# Patient Record
Sex: FEMALE | Race: WHITE
[De-identification: names, ages, dates, MRNs, and addresses within clinical notes are randomized per-mention and may not be internally consistent; named-entity substitution may affect disease eponyms.]

---

## 2018-01-28 ENCOUNTER — HOSPITAL ENCOUNTER (INPATIENT)
Dept: HOSPITAL 19 - COL.ER | Age: 68
LOS: 9 days | Discharge: NURSING, REHAB OR LONG-TERM CARE | DRG: 356 | End: 2018-02-06
Attending: INTERNAL MEDICINE | Admitting: INTERNAL MEDICINE
Payer: MEDICARE

## 2018-01-28 VITALS
DIASTOLIC BLOOD PRESSURE: 67 MMHG | SYSTOLIC BLOOD PRESSURE: 81 MMHG | TEMPERATURE: 96.8 F | OXYGEN SATURATION: 100 % | HEART RATE: 90 BPM

## 2018-01-28 VITALS — OXYGEN SATURATION: 100 %

## 2018-01-28 VITALS
OXYGEN SATURATION: 100 % | HEART RATE: 94 BPM | SYSTOLIC BLOOD PRESSURE: 122 MMHG | TEMPERATURE: 96.8 F | DIASTOLIC BLOOD PRESSURE: 57 MMHG

## 2018-01-28 VITALS — OXYGEN SATURATION: 99 %

## 2018-01-28 VITALS — OXYGEN SATURATION: 98 %

## 2018-01-28 VITALS — OXYGEN SATURATION: 88 %

## 2018-01-28 VITALS — OXYGEN SATURATION: 87 %

## 2018-01-28 VITALS — OXYGEN SATURATION: 91 %

## 2018-01-28 VITALS — OXYGEN SATURATION: 83 %

## 2018-01-28 VITALS — OXYGEN SATURATION: 96 %

## 2018-01-28 VITALS — OXYGEN SATURATION: 93 %

## 2018-01-28 VITALS — DIASTOLIC BLOOD PRESSURE: 66 MMHG | TEMPERATURE: 96.9 F | HEART RATE: 94 BPM | SYSTOLIC BLOOD PRESSURE: 145 MMHG

## 2018-01-28 VITALS — OXYGEN SATURATION: 97 %

## 2018-01-28 VITALS
HEART RATE: 90 BPM | DIASTOLIC BLOOD PRESSURE: 59 MMHG | OXYGEN SATURATION: 97 % | SYSTOLIC BLOOD PRESSURE: 92 MMHG | TEMPERATURE: 97.3 F

## 2018-01-28 VITALS
HEART RATE: 90 BPM | OXYGEN SATURATION: 100 % | DIASTOLIC BLOOD PRESSURE: 80 MMHG | SYSTOLIC BLOOD PRESSURE: 125 MMHG | TEMPERATURE: 97.1 F

## 2018-01-28 VITALS — OXYGEN SATURATION: 89 %

## 2018-01-28 VITALS — DIASTOLIC BLOOD PRESSURE: 78 MMHG | SYSTOLIC BLOOD PRESSURE: 146 MMHG | HEART RATE: 92 BPM | TEMPERATURE: 96.9 F

## 2018-01-28 VITALS
DIASTOLIC BLOOD PRESSURE: 63 MMHG | SYSTOLIC BLOOD PRESSURE: 103 MMHG | TEMPERATURE: 96.7 F | OXYGEN SATURATION: 95 % | HEART RATE: 92 BPM

## 2018-01-28 VITALS
OXYGEN SATURATION: 99 % | TEMPERATURE: 97.1 F | DIASTOLIC BLOOD PRESSURE: 66 MMHG | SYSTOLIC BLOOD PRESSURE: 145 MMHG | HEART RATE: 91 BPM

## 2018-01-28 VITALS — OXYGEN SATURATION: 94 %

## 2018-01-28 VITALS — OXYGEN SATURATION: 84 %

## 2018-01-28 VITALS
OXYGEN SATURATION: 98 % | HEART RATE: 93 BPM | TEMPERATURE: 96.9 F | SYSTOLIC BLOOD PRESSURE: 118 MMHG | DIASTOLIC BLOOD PRESSURE: 56 MMHG

## 2018-01-28 VITALS — OXYGEN SATURATION: 95 %

## 2018-01-28 VITALS
TEMPERATURE: 96.9 F | HEART RATE: 93 BPM | OXYGEN SATURATION: 100 % | SYSTOLIC BLOOD PRESSURE: 118 MMHG | DIASTOLIC BLOOD PRESSURE: 56 MMHG

## 2018-01-28 VITALS — OXYGEN SATURATION: 92 %

## 2018-01-28 VITALS — OXYGEN SATURATION: 82 %

## 2018-01-28 VITALS
OXYGEN SATURATION: 99 % | TEMPERATURE: 97 F | SYSTOLIC BLOOD PRESSURE: 137 MMHG | HEART RATE: 89 BPM | DIASTOLIC BLOOD PRESSURE: 62 MMHG

## 2018-01-28 VITALS — WEIGHT: 177.91 LBS | HEIGHT: 62.99 IN | BODY MASS INDEX: 31.52 KG/M2

## 2018-01-28 VITALS — OXYGEN SATURATION: 85 %

## 2018-01-28 VITALS — OXYGEN SATURATION: 86 %

## 2018-01-28 VITALS — OXYGEN SATURATION: 81 %

## 2018-01-28 DIAGNOSIS — N39.0: ICD-10-CM

## 2018-01-28 DIAGNOSIS — D62: ICD-10-CM

## 2018-01-28 DIAGNOSIS — Z89.411: ICD-10-CM

## 2018-01-28 DIAGNOSIS — K26.4: Primary | ICD-10-CM

## 2018-01-28 DIAGNOSIS — T87.81: ICD-10-CM

## 2018-01-28 DIAGNOSIS — I10: ICD-10-CM

## 2018-01-28 DIAGNOSIS — R57.1: ICD-10-CM

## 2018-01-28 DIAGNOSIS — E87.6: ICD-10-CM

## 2018-01-28 DIAGNOSIS — R57.8: ICD-10-CM

## 2018-01-28 DIAGNOSIS — E11.621: ICD-10-CM

## 2018-01-28 DIAGNOSIS — E87.2: ICD-10-CM

## 2018-01-28 DIAGNOSIS — H54.8: ICD-10-CM

## 2018-01-28 LAB
ALBUMIN SERPL-MCNC: 2.9 GM/DL (ref 3.5–5)
ALP SERPL-CCNC: 41 U/L (ref 50–136)
ALT SERPL-CCNC: 26 U/L (ref 9–52)
ANION GAP SERPL CALC-SCNC: 10 MMOL/L (ref 7–16)
APTT PPP: 28.4 SECONDS (ref 26–37)
AST SERPL-CCNC: 19 U/L (ref 15–37)
BASE EXCESS BLDA CALC-SCNC: -4.6 MMOL/L (ref -2–2)
BASOPHILS # BLD: 0 10*3/UL (ref 0–0.2)
BASOPHILS NFR BLD AUTO: 0.2 % (ref 0–2)
BILIRUB SERPL-MCNC: 0.4 MG/DL (ref 0–1)
BUN SERPL-MCNC: 41 MG/DL (ref 7–17)
CALCIUM SERPL-MCNC: 8.3 MG/DL (ref 8.4–10.2)
CHLORIDE SERPL-SCNC: 103 MMOL/L (ref 98–107)
CK SERPL-CCNC: 90 U/L (ref 30–135)
CO2 BLDA-SCNC: 20.7 MMOL/L
CO2 SERPL-SCNC: 21 MMOL/L (ref 22–30)
CREAT SERPL-SCNC: 1.42 MG/DL (ref 0.52–1.25)
DRUGS UR SCN NOM: NEGATIVE NG/ML
EOSINOPHIL # BLD: 0 10*3/UL (ref 0–0.7)
EOSINOPHIL NFR BLD: 0.1 % (ref 0–4)
ERYTHROCYTE [DISTWIDTH] IN BLOOD BY AUTOMATED COUNT: 17.8 % (ref 11.5–14.5)
GLUCOSE SERPL-MCNC: 179 MG/DL (ref 74–106)
GRANULOCYTES # BLD AUTO: 83.9 % (ref 42.2–75.2)
HCO3 BLDA-SCNC: 19.8 MEQ/L (ref 22–26)
HCT VFR BLD AUTO: 11.6 % (ref 37–47)
HGB BLD-MCNC: 3.5 G/DL (ref 12.5–16)
HYALINE CASTS #/AREA URNS LPF: (no result) /LPF
INR BLD: 1.2 (ref 0.8–3)
LYMPHOCYTES # BLD: 1.3 10*3/UL (ref 1.2–3.4)
LYMPHOCYTES NFR BLD: 10.6 % (ref 20–51)
MCH RBC QN AUTO: 32 PG (ref 27–31)
MCHC RBC AUTO-ENTMCNC: 30 G/DL (ref 33–37)
MCV RBC AUTO: 106 FL (ref 80–100)
MONOCYTES # BLD: 0.5 10*3/UL (ref 0.1–0.6)
MONOCYTES NFR BLD AUTO: 4 % (ref 1.7–9.3)
NEUTROPHILS # BLD: 10.3 10*3/UL (ref 1.4–6.5)
PCO2 BLDA: 31.6 MMHG (ref 35–45)
PH UR STRIP.AUTO: 6 [PH] (ref 5–8)
PLATELET # BLD AUTO: 375 K/MM3 (ref 130–400)
PMV BLD AUTO: 10.3 FL (ref 7.4–10.4)
PO2 BLDA: 156.1 MMHG (ref 80–100)
POTASSIUM SERPL-SCNC: 4.4 MMOL/L (ref 3.4–5)
PROT SERPL-MCNC: 5.6 GM/DL (ref 6.4–8.2)
PROTHROMBIN TIME: 14.4 SECONDS (ref 9.7–12.8)
RBC # BLD AUTO: 1.1 M/MM3 (ref 4.1–5.3)
RBC # UR: (no result) /HPF
SAO2 % BLDA: 98.5 % (ref 92–100)
SODIUM SERPL-SCNC: 134 MMOL/L (ref 137–145)
SP GR UR STRIP.AUTO: 1.01 (ref 1–1.03)
TROPONIN I SERPL-MCNC: < 0.012 NG/ML (ref 0–0.03)
URINE LEUKOCYTE ESTERASE: (no result)
URN COLLECT METHOD CLASS: (no result)
WBC # UR: (no result) /HPF

## 2018-01-28 PROCEDURE — C9113 INJ PANTOPRAZOLE SODIUM, VIA: HCPCS

## 2018-01-28 PROCEDURE — P9016 RBC LEUKOCYTES REDUCED: HCPCS

## 2018-01-29 VITALS — OXYGEN SATURATION: 98 %

## 2018-01-29 VITALS — OXYGEN SATURATION: 99 %

## 2018-01-29 VITALS — OXYGEN SATURATION: 97 %

## 2018-01-29 VITALS
DIASTOLIC BLOOD PRESSURE: 86 MMHG | TEMPERATURE: 98.5 F | HEART RATE: 108 BPM | OXYGEN SATURATION: 96 % | SYSTOLIC BLOOD PRESSURE: 163 MMHG

## 2018-01-29 VITALS — OXYGEN SATURATION: 96 %

## 2018-01-29 VITALS — OXYGEN SATURATION: 94 %

## 2018-01-29 VITALS — OXYGEN SATURATION: 100 %

## 2018-01-29 VITALS — OXYGEN SATURATION: 95 %

## 2018-01-29 VITALS
SYSTOLIC BLOOD PRESSURE: 159 MMHG | HEART RATE: 102 BPM | TEMPERATURE: 99.2 F | OXYGEN SATURATION: 97 % | DIASTOLIC BLOOD PRESSURE: 67 MMHG

## 2018-01-29 VITALS — TEMPERATURE: 97.8 F | HEART RATE: 97 BPM | SYSTOLIC BLOOD PRESSURE: 117 MMHG | DIASTOLIC BLOOD PRESSURE: 91 MMHG

## 2018-01-29 VITALS
DIASTOLIC BLOOD PRESSURE: 71 MMHG | TEMPERATURE: 98.8 F | OXYGEN SATURATION: 97 % | HEART RATE: 97 BPM | SYSTOLIC BLOOD PRESSURE: 159 MMHG

## 2018-01-29 VITALS — OXYGEN SATURATION: 93 %

## 2018-01-29 VITALS
DIASTOLIC BLOOD PRESSURE: 76 MMHG | OXYGEN SATURATION: 98 % | HEART RATE: 98 BPM | SYSTOLIC BLOOD PRESSURE: 162 MMHG | TEMPERATURE: 97.2 F

## 2018-01-29 VITALS
OXYGEN SATURATION: 100 % | SYSTOLIC BLOOD PRESSURE: 172 MMHG | DIASTOLIC BLOOD PRESSURE: 71 MMHG | HEART RATE: 110 BPM | TEMPERATURE: 98.7 F

## 2018-01-29 VITALS
SYSTOLIC BLOOD PRESSURE: 180 MMHG | TEMPERATURE: 98.5 F | OXYGEN SATURATION: 97 % | HEART RATE: 107 BPM | DIASTOLIC BLOOD PRESSURE: 77 MMHG

## 2018-01-29 VITALS
DIASTOLIC BLOOD PRESSURE: 70 MMHG | HEART RATE: 102 BPM | TEMPERATURE: 97.6 F | OXYGEN SATURATION: 97 % | SYSTOLIC BLOOD PRESSURE: 162 MMHG

## 2018-01-29 VITALS
DIASTOLIC BLOOD PRESSURE: 77 MMHG | TEMPERATURE: 98.3 F | HEART RATE: 105 BPM | OXYGEN SATURATION: 96 % | SYSTOLIC BLOOD PRESSURE: 180 MMHG

## 2018-01-29 VITALS — OXYGEN SATURATION: 91 %

## 2018-01-29 VITALS
HEART RATE: 107 BPM | OXYGEN SATURATION: 100 % | SYSTOLIC BLOOD PRESSURE: 172 MMHG | DIASTOLIC BLOOD PRESSURE: 71 MMHG | TEMPERATURE: 98.4 F

## 2018-01-29 VITALS
SYSTOLIC BLOOD PRESSURE: 168 MMHG | DIASTOLIC BLOOD PRESSURE: 70 MMHG | HEART RATE: 107 BPM | TEMPERATURE: 98.1 F | OXYGEN SATURATION: 97 %

## 2018-01-29 VITALS
SYSTOLIC BLOOD PRESSURE: 123 MMHG | HEART RATE: 97 BPM | DIASTOLIC BLOOD PRESSURE: 56 MMHG | TEMPERATURE: 98.4 F | OXYGEN SATURATION: 94 %

## 2018-01-29 VITALS — OXYGEN SATURATION: 89 %

## 2018-01-29 VITALS — OXYGEN SATURATION: 92 %

## 2018-01-29 VITALS
TEMPERATURE: 98.2 F | OXYGEN SATURATION: 100 % | SYSTOLIC BLOOD PRESSURE: 169 MMHG | HEART RATE: 107 BPM | DIASTOLIC BLOOD PRESSURE: 77 MMHG

## 2018-01-29 VITALS — OXYGEN SATURATION: 90 %

## 2018-01-29 VITALS
OXYGEN SATURATION: 100 % | HEART RATE: 103 BPM | TEMPERATURE: 98.3 F | SYSTOLIC BLOOD PRESSURE: 170 MMHG | DIASTOLIC BLOOD PRESSURE: 76 MMHG

## 2018-01-29 VITALS
SYSTOLIC BLOOD PRESSURE: 161 MMHG | HEART RATE: 102 BPM | DIASTOLIC BLOOD PRESSURE: 77 MMHG | TEMPERATURE: 98.1 F | OXYGEN SATURATION: 96 %

## 2018-01-29 VITALS — OXYGEN SATURATION: 88 %

## 2018-01-29 LAB
ANION GAP SERPL CALC-SCNC: 8 MMOL/L (ref 7–16)
BASOPHILS # BLD: 0.1 10*3/UL (ref 0–0.2)
BASOPHILS NFR BLD AUTO: 0.5 % (ref 0–2)
BUN SERPL-MCNC: 26 MG/DL (ref 7–17)
CALCIUM SERPL-MCNC: 8.7 MG/DL (ref 8.4–10.2)
CHLORIDE SERPL-SCNC: 106 MMOL/L (ref 98–107)
CO2 SERPL-SCNC: 22 MMOL/L (ref 22–30)
CREAT SERPL-SCNC: 0.89 MG/DL (ref 0.52–1.25)
EOSINOPHIL # BLD: 0 10*3/UL (ref 0–0.7)
EOSINOPHIL NFR BLD: 0.1 % (ref 0–4)
ERYTHROCYTE [DISTWIDTH] IN BLOOD BY AUTOMATED COUNT: 16.1 % (ref 11.5–14.5)
GLUCOSE SERPL-MCNC: 132 MG/DL (ref 74–106)
GRANULOCYTES # BLD AUTO: 78.6 % (ref 42.2–75.2)
HCT VFR BLD AUTO: 20.9 % (ref 37–47)
HCT VFR BLD AUTO: 27.3 % (ref 37–47)
HCT VFR BLD AUTO: 28.2 % (ref 37–47)
HGB BLD-MCNC: 6.9 G/DL (ref 12.5–16)
HGB BLD-MCNC: 9.2 G/DL (ref 12.5–16)
HGB BLD-MCNC: 9.4 G/DL (ref 12.5–16)
LYMPHOCYTES # BLD: 1.6 10*3/UL (ref 1.2–3.4)
LYMPHOCYTES NFR BLD: 13.4 % (ref 20–51)
MCH RBC QN AUTO: 31 PG (ref 27–31)
MCHC RBC AUTO-ENTMCNC: 33 G/DL (ref 33–37)
MCV RBC AUTO: 93 FL (ref 80–100)
MONOCYTES # BLD: 0.8 10*3/UL (ref 0.1–0.6)
MONOCYTES NFR BLD AUTO: 6.4 % (ref 1.7–9.3)
NEUTROPHILS # BLD: 9.2 10*3/UL (ref 1.4–6.5)
PLATELET # BLD AUTO: 332 K/MM3 (ref 130–400)
PMV BLD AUTO: 10.1 FL (ref 7.4–10.4)
POTASSIUM SERPL-SCNC: 4.3 MMOL/L (ref 3.4–5)
RBC # BLD AUTO: 3.02 M/MM3 (ref 4.1–5.3)
SODIUM SERPL-SCNC: 136 MMOL/L (ref 137–145)

## 2018-01-29 PROCEDURE — 0W3P8ZZ CONTROL BLEEDING IN GASTROINTESTINAL TRACT, VIA NATURAL OR ARTIFICIAL OPENING ENDOSCOPIC: ICD-10-PCS | Performed by: SPECIALIST

## 2018-01-29 PROCEDURE — 0DB68ZX EXCISION OF STOMACH, VIA NATURAL OR ARTIFICIAL OPENING ENDOSCOPIC, DIAGNOSTIC: ICD-10-PCS | Performed by: SPECIALIST

## 2018-01-30 VITALS — OXYGEN SATURATION: 98 %

## 2018-01-30 VITALS — OXYGEN SATURATION: 88 %

## 2018-01-30 VITALS — OXYGEN SATURATION: 97 %

## 2018-01-30 VITALS — OXYGEN SATURATION: 92 %

## 2018-01-30 VITALS — OXYGEN SATURATION: 96 %

## 2018-01-30 VITALS
OXYGEN SATURATION: 100 % | TEMPERATURE: 98.4 F | DIASTOLIC BLOOD PRESSURE: 68 MMHG | HEART RATE: 86 BPM | SYSTOLIC BLOOD PRESSURE: 136 MMHG

## 2018-01-30 VITALS — DIASTOLIC BLOOD PRESSURE: 67 MMHG | TEMPERATURE: 98.4 F | HEART RATE: 99 BPM | SYSTOLIC BLOOD PRESSURE: 144 MMHG

## 2018-01-30 VITALS — OXYGEN SATURATION: 95 %

## 2018-01-30 VITALS — OXYGEN SATURATION: 93 %

## 2018-01-30 VITALS — OXYGEN SATURATION: 99 %

## 2018-01-30 VITALS — OXYGEN SATURATION: 94 %

## 2018-01-30 VITALS — OXYGEN SATURATION: 91 %

## 2018-01-30 VITALS
DIASTOLIC BLOOD PRESSURE: 64 MMHG | TEMPERATURE: 98.2 F | HEART RATE: 92 BPM | OXYGEN SATURATION: 99 % | SYSTOLIC BLOOD PRESSURE: 122 MMHG

## 2018-01-30 VITALS — DIASTOLIC BLOOD PRESSURE: 72 MMHG | HEART RATE: 91 BPM | SYSTOLIC BLOOD PRESSURE: 131 MMHG | TEMPERATURE: 98.9 F

## 2018-01-30 VITALS — OXYGEN SATURATION: 89 %

## 2018-01-30 VITALS — OXYGEN SATURATION: 100 %

## 2018-01-30 VITALS — OXYGEN SATURATION: 90 %

## 2018-01-30 VITALS — OXYGEN SATURATION: 87 %

## 2018-01-30 VITALS — SYSTOLIC BLOOD PRESSURE: 151 MMHG | HEART RATE: 89 BPM | DIASTOLIC BLOOD PRESSURE: 67 MMHG | OXYGEN SATURATION: 95 %

## 2018-01-30 VITALS — DIASTOLIC BLOOD PRESSURE: 69 MMHG | TEMPERATURE: 98.7 F | HEART RATE: 92 BPM | SYSTOLIC BLOOD PRESSURE: 144 MMHG

## 2018-01-30 LAB
ANION GAP SERPL CALC-SCNC: 6 MMOL/L (ref 7–16)
BASOPHILS # BLD: 0.1 10*3/UL (ref 0–0.2)
BASOPHILS NFR BLD AUTO: 0.6 % (ref 0–2)
BUN SERPL-MCNC: 16 MG/DL (ref 7–17)
CALCIUM SERPL-MCNC: 8.3 MG/DL (ref 8.4–10.2)
CHLORIDE SERPL-SCNC: 107 MMOL/L (ref 98–107)
CO2 SERPL-SCNC: 22 MMOL/L (ref 22–30)
CREAT SERPL-SCNC: 0.75 MG/DL (ref 0.52–1.25)
EOSINOPHIL # BLD: 0.1 10*3/UL (ref 0–0.7)
EOSINOPHIL NFR BLD: 1.4 % (ref 0–4)
ERYTHROCYTE [DISTWIDTH] IN BLOOD BY AUTOMATED COUNT: 17.2 % (ref 11.5–14.5)
GLUCOSE SERPL-MCNC: 88 MG/DL (ref 74–106)
GRANULOCYTES # BLD AUTO: 61.6 % (ref 42.2–75.2)
HCT VFR BLD AUTO: 26.1 % (ref 37–47)
HCT VFR BLD AUTO: 26.3 % (ref 37–47)
HGB BLD-MCNC: 8.7 G/DL (ref 12.5–16)
HGB BLD-MCNC: 8.7 G/DL (ref 12.5–16)
LYMPHOCYTES # BLD: 2.2 10*3/UL (ref 1.2–3.4)
LYMPHOCYTES NFR BLD: 27.7 % (ref 20–51)
MCH RBC QN AUTO: 32 PG (ref 27–31)
MCHC RBC AUTO-ENTMCNC: 33 G/DL (ref 33–37)
MCV RBC AUTO: 96 FL (ref 80–100)
MONOCYTES # BLD: 0.6 10*3/UL (ref 0.1–0.6)
MONOCYTES NFR BLD AUTO: 8.1 % (ref 1.7–9.3)
NEUTROPHILS # BLD: 4.8 10*3/UL (ref 1.4–6.5)
PLATELET # BLD AUTO: 309 K/MM3 (ref 130–400)
PMV BLD AUTO: 9.7 FL (ref 7.4–10.4)
POTASSIUM SERPL-SCNC: 3.4 MMOL/L (ref 3.4–5)
RBC # BLD AUTO: 2.75 M/MM3 (ref 4.1–5.3)
SODIUM SERPL-SCNC: 135 MMOL/L (ref 137–145)

## 2018-01-31 VITALS — HEART RATE: 82 BPM | TEMPERATURE: 98.1 F | SYSTOLIC BLOOD PRESSURE: 143 MMHG | DIASTOLIC BLOOD PRESSURE: 86 MMHG

## 2018-01-31 VITALS — HEART RATE: 93 BPM | SYSTOLIC BLOOD PRESSURE: 138 MMHG | DIASTOLIC BLOOD PRESSURE: 77 MMHG | TEMPERATURE: 97.9 F

## 2018-01-31 VITALS — TEMPERATURE: 98.3 F | HEART RATE: 86 BPM | DIASTOLIC BLOOD PRESSURE: 60 MMHG | SYSTOLIC BLOOD PRESSURE: 148 MMHG

## 2018-01-31 VITALS — SYSTOLIC BLOOD PRESSURE: 131 MMHG | TEMPERATURE: 98.1 F | DIASTOLIC BLOOD PRESSURE: 59 MMHG | HEART RATE: 88 BPM

## 2018-01-31 LAB
ANION GAP SERPL CALC-SCNC: 7 MMOL/L (ref 7–16)
ANISOCYTOSIS BLD QL: (no result)
BUN SERPL-MCNC: 10 MG/DL (ref 7–17)
CALCIUM SERPL-MCNC: 8.2 MG/DL (ref 8.4–10.2)
CHLORIDE SERPL-SCNC: 105 MMOL/L (ref 98–107)
CO2 SERPL-SCNC: 24 MMOL/L (ref 22–30)
CREAT SERPL-SCNC: 0.71 MG/DL (ref 0.52–1.25)
EOSINOPHIL NFR BLD: 3 % (ref 0–4)
ERYTHROCYTE [DISTWIDTH] IN BLOOD BY AUTOMATED COUNT: 17.2 % (ref 11.5–14.5)
GLUCOSE SERPL-MCNC: 85 MG/DL (ref 74–106)
HCT VFR BLD AUTO: 25.5 % (ref 37–47)
HGB BLD-MCNC: 8.4 G/DL (ref 12.5–16)
HYPOCHROMIA BLD QL SMEAR: (no result)
LYMPHOCYTES NFR BLD MANUAL: 26 % (ref 20–51)
MAGNESIUM SERPL-MCNC: 1.8 MG/DL (ref 1.6–2.3)
MCH RBC QN AUTO: 32 PG (ref 27–31)
MCHC RBC AUTO-ENTMCNC: 33 G/DL (ref 33–37)
MCV RBC AUTO: 96 FL (ref 80–100)
MONOCYTES NFR BLD: 2 % (ref 1.7–9.3)
NEUTS BAND NFR BLD: 21 % (ref 0–10)
NEUTS SEG NFR BLD MANUAL: 48 % (ref 42–75.2)
PLATELET # BLD AUTO: 314 K/MM3 (ref 130–400)
PLATELET BLD QL SMEAR: NORMAL
PMV BLD AUTO: 10.1 FL (ref 7.4–10.4)
POTASSIUM SERPL-SCNC: 2.9 MMOL/L (ref 3.4–5)
RBC # BLD AUTO: 2.65 M/MM3 (ref 4.1–5.3)
SODIUM SERPL-SCNC: 136 MMOL/L (ref 137–145)

## 2018-01-31 PROCEDURE — 0QBN0ZZ EXCISION OF RIGHT METATARSAL, OPEN APPROACH: ICD-10-PCS | Performed by: ORTHOPAEDIC SURGERY

## 2018-02-01 VITALS — DIASTOLIC BLOOD PRESSURE: 77 MMHG | HEART RATE: 95 BPM | TEMPERATURE: 98.5 F | SYSTOLIC BLOOD PRESSURE: 153 MMHG

## 2018-02-01 VITALS — HEART RATE: 81 BPM | TEMPERATURE: 97.9 F | SYSTOLIC BLOOD PRESSURE: 153 MMHG | DIASTOLIC BLOOD PRESSURE: 74 MMHG

## 2018-02-01 VITALS — SYSTOLIC BLOOD PRESSURE: 154 MMHG | HEART RATE: 80 BPM | TEMPERATURE: 98 F | DIASTOLIC BLOOD PRESSURE: 73 MMHG

## 2018-02-01 VITALS — SYSTOLIC BLOOD PRESSURE: 159 MMHG | TEMPERATURE: 98.1 F | DIASTOLIC BLOOD PRESSURE: 72 MMHG | HEART RATE: 85 BPM

## 2018-02-01 VITALS — SYSTOLIC BLOOD PRESSURE: 136 MMHG | TEMPERATURE: 97.7 F | HEART RATE: 93 BPM | DIASTOLIC BLOOD PRESSURE: 55 MMHG

## 2018-02-01 VITALS — DIASTOLIC BLOOD PRESSURE: 76 MMHG | SYSTOLIC BLOOD PRESSURE: 146 MMHG | HEART RATE: 86 BPM | TEMPERATURE: 97.9 F

## 2018-02-01 VITALS — TEMPERATURE: 97.8 F | SYSTOLIC BLOOD PRESSURE: 144 MMHG | HEART RATE: 171 BPM | DIASTOLIC BLOOD PRESSURE: 81 MMHG

## 2018-02-01 LAB
ANION GAP SERPL CALC-SCNC: 5 MMOL/L (ref 7–16)
BASOPHILS # BLD: 0 10*3/UL (ref 0–0.2)
BASOPHILS NFR BLD AUTO: 0.6 % (ref 0–2)
BUN SERPL-MCNC: 7 MG/DL (ref 7–17)
CALCIUM SERPL-MCNC: 8.2 MG/DL (ref 8.4–10.2)
CHLORIDE SERPL-SCNC: 103 MMOL/L (ref 98–107)
CO2 SERPL-SCNC: 27 MMOL/L (ref 22–30)
CREAT SERPL-SCNC: 0.7 MG/DL (ref 0.52–1.25)
EOSINOPHIL # BLD: 0.3 10*3/UL (ref 0–0.7)
EOSINOPHIL NFR BLD: 6.4 % (ref 0–4)
ERYTHROCYTE [DISTWIDTH] IN BLOOD BY AUTOMATED COUNT: 17.1 % (ref 11.5–14.5)
GLUCOSE SERPL-MCNC: 102 MG/DL (ref 74–106)
GRANULOCYTES # BLD AUTO: 49.3 % (ref 42.2–75.2)
HCT VFR BLD AUTO: 26.3 % (ref 37–47)
HGB BLD-MCNC: 8.4 G/DL (ref 12.5–16)
LYMPHOCYTES # BLD: 1.6 10*3/UL (ref 1.2–3.4)
LYMPHOCYTES NFR BLD: 34.6 % (ref 20–51)
MCH RBC QN AUTO: 31 PG (ref 27–31)
MCHC RBC AUTO-ENTMCNC: 32 G/DL (ref 33–37)
MCV RBC AUTO: 98 FL (ref 80–100)
MONOCYTES # BLD: 0.4 10*3/UL (ref 0.1–0.6)
MONOCYTES NFR BLD AUTO: 8.5 % (ref 1.7–9.3)
NEUTROPHILS # BLD: 2.3 10*3/UL (ref 1.4–6.5)
PLATELET # BLD AUTO: 304 K/MM3 (ref 130–400)
PMV BLD AUTO: 9.9 FL (ref 7.4–10.4)
POTASSIUM SERPL-SCNC: 3.6 MMOL/L (ref 3.4–5)
RBC # BLD AUTO: 2.68 M/MM3 (ref 4.1–5.3)
SODIUM SERPL-SCNC: 136 MMOL/L (ref 137–145)

## 2018-02-02 VITALS — DIASTOLIC BLOOD PRESSURE: 97 MMHG | HEART RATE: 84 BPM | TEMPERATURE: 97.9 F | SYSTOLIC BLOOD PRESSURE: 134 MMHG

## 2018-02-02 VITALS — HEART RATE: 93 BPM | SYSTOLIC BLOOD PRESSURE: 182 MMHG | DIASTOLIC BLOOD PRESSURE: 82 MMHG | TEMPERATURE: 98 F

## 2018-02-02 VITALS — SYSTOLIC BLOOD PRESSURE: 168 MMHG | DIASTOLIC BLOOD PRESSURE: 81 MMHG | TEMPERATURE: 97.9 F | HEART RATE: 75 BPM

## 2018-02-02 VITALS — SYSTOLIC BLOOD PRESSURE: 113 MMHG | HEART RATE: 86 BPM | TEMPERATURE: 98 F | DIASTOLIC BLOOD PRESSURE: 94 MMHG

## 2018-02-02 VITALS — HEART RATE: 105 BPM | TEMPERATURE: 98 F | SYSTOLIC BLOOD PRESSURE: 145 MMHG | DIASTOLIC BLOOD PRESSURE: 82 MMHG

## 2018-02-02 LAB
ANION GAP SERPL CALC-SCNC: 5 MMOL/L (ref 7–16)
BASOPHILS # BLD: 0 10*3/UL (ref 0–0.2)
BASOPHILS NFR BLD AUTO: 0.5 % (ref 0–2)
BUN SERPL-MCNC: 8 MG/DL (ref 7–17)
CALCIUM SERPL-MCNC: 8.6 MG/DL (ref 8.4–10.2)
CHLORIDE SERPL-SCNC: 102 MMOL/L (ref 98–107)
CO2 SERPL-SCNC: 29 MMOL/L (ref 22–30)
CREAT SERPL-SCNC: 0.74 MG/DL (ref 0.52–1.25)
EOSINOPHIL # BLD: 0.3 10*3/UL (ref 0–0.7)
EOSINOPHIL NFR BLD: 4.9 % (ref 0–4)
ERYTHROCYTE [DISTWIDTH] IN BLOOD BY AUTOMATED COUNT: 17.2 % (ref 11.5–14.5)
GLUCOSE SERPL-MCNC: 135 MG/DL (ref 74–106)
GRANULOCYTES # BLD AUTO: 66.8 % (ref 42.2–75.2)
HCT VFR BLD AUTO: 26.6 % (ref 37–47)
HGB BLD-MCNC: 8.5 G/DL (ref 12.5–16)
LYMPHOCYTES # BLD: 1.2 10*3/UL (ref 1.2–3.4)
LYMPHOCYTES NFR BLD: 21.3 % (ref 20–51)
MCH RBC QN AUTO: 32 PG (ref 27–31)
MCHC RBC AUTO-ENTMCNC: 32 G/DL (ref 33–37)
MCV RBC AUTO: 99 FL (ref 80–100)
MONOCYTES # BLD: 0.3 10*3/UL (ref 0.1–0.6)
MONOCYTES NFR BLD AUTO: 6.1 % (ref 1.7–9.3)
NEUTROPHILS # BLD: 3.7 10*3/UL (ref 1.4–6.5)
PLATELET # BLD AUTO: 300 K/MM3 (ref 130–400)
PMV BLD AUTO: 10.1 FL (ref 7.4–10.4)
POTASSIUM SERPL-SCNC: 3.3 MMOL/L (ref 3.4–5)
RBC # BLD AUTO: 2.69 M/MM3 (ref 4.1–5.3)
SODIUM SERPL-SCNC: 136 MMOL/L (ref 137–145)

## 2018-02-03 VITALS — SYSTOLIC BLOOD PRESSURE: 152 MMHG | HEART RATE: 91 BPM | TEMPERATURE: 98.1 F | DIASTOLIC BLOOD PRESSURE: 89 MMHG

## 2018-02-03 VITALS — SYSTOLIC BLOOD PRESSURE: 154 MMHG | HEART RATE: 91 BPM | DIASTOLIC BLOOD PRESSURE: 75 MMHG | TEMPERATURE: 98.3 F

## 2018-02-03 VITALS — TEMPERATURE: 99.2 F | DIASTOLIC BLOOD PRESSURE: 68 MMHG | SYSTOLIC BLOOD PRESSURE: 159 MMHG | HEART RATE: 96 BPM

## 2018-02-03 VITALS — SYSTOLIC BLOOD PRESSURE: 174 MMHG | HEART RATE: 91 BPM | DIASTOLIC BLOOD PRESSURE: 75 MMHG | TEMPERATURE: 98.1 F

## 2018-02-03 VITALS — HEART RATE: 99 BPM | DIASTOLIC BLOOD PRESSURE: 75 MMHG | TEMPERATURE: 98.7 F | SYSTOLIC BLOOD PRESSURE: 150 MMHG

## 2018-02-03 VITALS — DIASTOLIC BLOOD PRESSURE: 76 MMHG | SYSTOLIC BLOOD PRESSURE: 151 MMHG | TEMPERATURE: 98.3 F | HEART RATE: 95 BPM

## 2018-02-03 VITALS — SYSTOLIC BLOOD PRESSURE: 134 MMHG | HEART RATE: 97 BPM | DIASTOLIC BLOOD PRESSURE: 65 MMHG | TEMPERATURE: 98 F

## 2018-02-03 LAB
ANION GAP SERPL CALC-SCNC: 8 MMOL/L (ref 7–16)
BASOPHILS # BLD: 0 10*3/UL (ref 0–0.2)
BASOPHILS NFR BLD AUTO: 0.6 % (ref 0–2)
BUN SERPL-MCNC: 11 MG/DL (ref 7–17)
CALCIUM SERPL-MCNC: 9.3 MG/DL (ref 8.4–10.2)
CHLORIDE SERPL-SCNC: 101 MMOL/L (ref 98–107)
CO2 SERPL-SCNC: 28 MMOL/L (ref 22–30)
CREAT SERPL-SCNC: 0.77 MG/DL (ref 0.52–1.25)
EOSINOPHIL # BLD: 0.3 10*3/UL (ref 0–0.7)
EOSINOPHIL NFR BLD: 5.3 % (ref 0–4)
ERYTHROCYTE [DISTWIDTH] IN BLOOD BY AUTOMATED COUNT: 17.5 % (ref 11.5–14.5)
GLUCOSE SERPL-MCNC: 90 MG/DL (ref 74–106)
GRANULOCYTES # BLD AUTO: 43.7 % (ref 42.2–75.2)
HCT VFR BLD AUTO: 28.8 % (ref 37–47)
HGB BLD-MCNC: 9.1 G/DL (ref 12.5–16)
LYMPHOCYTES # BLD: 2.2 10*3/UL (ref 1.2–3.4)
LYMPHOCYTES NFR BLD: 43.1 % (ref 20–51)
MAGNESIUM SERPL-MCNC: 1.9 MG/DL (ref 1.6–2.3)
MCH RBC QN AUTO: 31 PG (ref 27–31)
MCHC RBC AUTO-ENTMCNC: 32 G/DL (ref 33–37)
MCV RBC AUTO: 99 FL (ref 80–100)
MONOCYTES # BLD: 0.4 10*3/UL (ref 0.1–0.6)
MONOCYTES NFR BLD AUTO: 7.1 % (ref 1.7–9.3)
NEUTROPHILS # BLD: 2.2 10*3/UL (ref 1.4–6.5)
PLATELET # BLD AUTO: 335 K/MM3 (ref 130–400)
PMV BLD AUTO: 10.2 FL (ref 7.4–10.4)
POTASSIUM SERPL-SCNC: 3.6 MMOL/L (ref 3.4–5)
RBC # BLD AUTO: 2.9 M/MM3 (ref 4.1–5.3)
SODIUM SERPL-SCNC: 137 MMOL/L (ref 137–145)

## 2018-02-04 VITALS — SYSTOLIC BLOOD PRESSURE: 142 MMHG | HEART RATE: 86 BPM | TEMPERATURE: 98.1 F | DIASTOLIC BLOOD PRESSURE: 71 MMHG

## 2018-02-04 VITALS — SYSTOLIC BLOOD PRESSURE: 142 MMHG | HEART RATE: 71 BPM | TEMPERATURE: 97.9 F | DIASTOLIC BLOOD PRESSURE: 38 MMHG

## 2018-02-04 VITALS — DIASTOLIC BLOOD PRESSURE: 91 MMHG | HEART RATE: 97 BPM | TEMPERATURE: 98.3 F | SYSTOLIC BLOOD PRESSURE: 141 MMHG

## 2018-02-04 VITALS — TEMPERATURE: 97.7 F | SYSTOLIC BLOOD PRESSURE: 153 MMHG | DIASTOLIC BLOOD PRESSURE: 72 MMHG | HEART RATE: 76 BPM

## 2018-02-04 VITALS — HEART RATE: 74 BPM | DIASTOLIC BLOOD PRESSURE: 83 MMHG | SYSTOLIC BLOOD PRESSURE: 141 MMHG | TEMPERATURE: 98.1 F

## 2018-02-04 VITALS — TEMPERATURE: 98 F | DIASTOLIC BLOOD PRESSURE: 53 MMHG | SYSTOLIC BLOOD PRESSURE: 115 MMHG | HEART RATE: 89 BPM

## 2018-02-04 LAB
ANION GAP SERPL CALC-SCNC: 7 MMOL/L (ref 7–16)
ANISOCYTOSIS BLD QL: (no result)
BASOPHILS NFR BLD MANUAL: 1 % (ref 0–2)
BUN SERPL-MCNC: 13 MG/DL (ref 7–17)
CALCIUM SERPL-MCNC: 9.1 MG/DL (ref 8.4–10.2)
CHLORIDE SERPL-SCNC: 103 MMOL/L (ref 98–107)
CO2 SERPL-SCNC: 27 MMOL/L (ref 22–30)
CREAT SERPL-SCNC: 0.91 MG/DL (ref 0.52–1.25)
EOSINOPHIL NFR BLD: 6 % (ref 0–4)
ERYTHROCYTE [DISTWIDTH] IN BLOOD BY AUTOMATED COUNT: 17.3 % (ref 11.5–14.5)
FOLATE (FOLIC ACID): 11.7 NG/ML (ref 7–31.4)
GLUCOSE SERPL-MCNC: 96 MG/DL (ref 74–106)
HCT VFR BLD AUTO: 30.4 % (ref 37–47)
HGB BLD-MCNC: 9.7 G/DL (ref 12.5–16)
LYMPHOCYTES NFR BLD MANUAL: 34 % (ref 20–51)
MAGNESIUM SERPL-MCNC: 2.1 MG/DL (ref 1.6–2.3)
MCH RBC QN AUTO: 32 PG (ref 27–31)
MCHC RBC AUTO-ENTMCNC: 32 G/DL (ref 33–37)
MCV RBC AUTO: 100 FL (ref 80–100)
MONOCYTES NFR BLD: 4 % (ref 1.7–9.3)
NEUTS BAND NFR BLD: 12 % (ref 0–10)
NEUTS SEG NFR BLD MANUAL: 43 % (ref 42–75.2)
PHOSPHATE SERPL-MCNC: 4.5 MG/DL (ref 2.5–4.5)
PLATELET # BLD AUTO: 348 K/MM3 (ref 130–400)
PLATELET BLD QL SMEAR: NORMAL
PMV BLD AUTO: 9.9 FL (ref 7.4–10.4)
POLYCHROMASIA BLD QL SMEAR: (no result)
POTASSIUM SERPL-SCNC: 4.1 MMOL/L (ref 3.4–5)
RBC # BLD AUTO: 3.05 M/MM3 (ref 4.1–5.3)
SODIUM SERPL-SCNC: 137 MMOL/L (ref 137–145)
TOXIC GRANULES BLD QL SMEAR: PRESENT

## 2018-02-05 VITALS — HEART RATE: 62 BPM | TEMPERATURE: 97.9 F | SYSTOLIC BLOOD PRESSURE: 113 MMHG | DIASTOLIC BLOOD PRESSURE: 57 MMHG

## 2018-02-05 VITALS — HEART RATE: 77 BPM | DIASTOLIC BLOOD PRESSURE: 53 MMHG | SYSTOLIC BLOOD PRESSURE: 131 MMHG | TEMPERATURE: 98 F

## 2018-02-05 VITALS — SYSTOLIC BLOOD PRESSURE: 111 MMHG | HEART RATE: 76 BPM | DIASTOLIC BLOOD PRESSURE: 58 MMHG | TEMPERATURE: 97.7 F

## 2018-02-05 VITALS — DIASTOLIC BLOOD PRESSURE: 52 MMHG | SYSTOLIC BLOOD PRESSURE: 117 MMHG | HEART RATE: 85 BPM

## 2018-02-05 VITALS — HEART RATE: 73 BPM | TEMPERATURE: 97.3 F | SYSTOLIC BLOOD PRESSURE: 103 MMHG | DIASTOLIC BLOOD PRESSURE: 48 MMHG

## 2018-02-05 VITALS — SYSTOLIC BLOOD PRESSURE: 119 MMHG | DIASTOLIC BLOOD PRESSURE: 76 MMHG | HEART RATE: 69 BPM | TEMPERATURE: 98.1 F

## 2018-02-05 VITALS — SYSTOLIC BLOOD PRESSURE: 105 MMHG | DIASTOLIC BLOOD PRESSURE: 48 MMHG | TEMPERATURE: 97.9 F | HEART RATE: 71 BPM

## 2018-02-05 VITALS — DIASTOLIC BLOOD PRESSURE: 81 MMHG | SYSTOLIC BLOOD PRESSURE: 136 MMHG | HEART RATE: 84 BPM

## 2018-02-05 VITALS — DIASTOLIC BLOOD PRESSURE: 49 MMHG | HEART RATE: 86 BPM | SYSTOLIC BLOOD PRESSURE: 119 MMHG

## 2018-02-05 VITALS — DIASTOLIC BLOOD PRESSURE: 51 MMHG | HEART RATE: 85 BPM | SYSTOLIC BLOOD PRESSURE: 116 MMHG

## 2018-02-05 VITALS — DIASTOLIC BLOOD PRESSURE: 46 MMHG | TEMPERATURE: 97.9 F | HEART RATE: 85 BPM | SYSTOLIC BLOOD PRESSURE: 121 MMHG

## 2018-02-05 VITALS — TEMPERATURE: 97.7 F | SYSTOLIC BLOOD PRESSURE: 127 MMHG | HEART RATE: 86 BPM | DIASTOLIC BLOOD PRESSURE: 54 MMHG

## 2018-02-05 VITALS — TEMPERATURE: 98.2 F | HEART RATE: 86 BPM | DIASTOLIC BLOOD PRESSURE: 66 MMHG | SYSTOLIC BLOOD PRESSURE: 107 MMHG

## 2018-02-05 LAB
ANION GAP SERPL CALC-SCNC: 4 MMOL/L (ref 7–16)
BASOPHILS # BLD: 0.1 10*3/UL (ref 0–0.2)
BASOPHILS NFR BLD AUTO: 1 % (ref 0–2)
BUN SERPL-MCNC: 13 MG/DL (ref 7–17)
CALCIUM SERPL-MCNC: 9.1 MG/DL (ref 8.4–10.2)
CHLORIDE SERPL-SCNC: 102 MMOL/L (ref 98–107)
CO2 SERPL-SCNC: 27 MMOL/L (ref 22–30)
CREAT SERPL-SCNC: 0.85 MG/DL (ref 0.52–1.25)
EOSINOPHIL # BLD: 0.3 10*3/UL (ref 0–0.7)
EOSINOPHIL NFR BLD: 4.5 % (ref 0–4)
ERYTHROCYTE [DISTWIDTH] IN BLOOD BY AUTOMATED COUNT: 16.8 % (ref 11.5–14.5)
GLUCOSE SERPL-MCNC: 91 MG/DL (ref 74–106)
GRANULOCYTES # BLD AUTO: 51.6 % (ref 42.2–75.2)
HCT VFR BLD AUTO: 31.2 % (ref 37–47)
HGB BLD-MCNC: 9.8 G/DL (ref 12.5–16)
LYMPHOCYTES # BLD: 2.4 10*3/UL (ref 1.2–3.4)
LYMPHOCYTES NFR BLD: 35.3 % (ref 20–51)
MAGNESIUM SERPL-MCNC: 2 MG/DL (ref 1.6–2.3)
MCH RBC QN AUTO: 32 PG (ref 27–31)
MCHC RBC AUTO-ENTMCNC: 31 G/DL (ref 33–37)
MCV RBC AUTO: 101 FL (ref 80–100)
MONOCYTES # BLD: 0.5 10*3/UL (ref 0.1–0.6)
MONOCYTES NFR BLD AUTO: 7.2 % (ref 1.7–9.3)
NEUTROPHILS # BLD: 3.5 10*3/UL (ref 1.4–6.5)
PHOSPHATE SERPL-MCNC: 4.4 MG/DL (ref 2.5–4.5)
PLATELET # BLD AUTO: 366 K/MM3 (ref 130–400)
PMV BLD AUTO: 10.1 FL (ref 7.4–10.4)
POTASSIUM SERPL-SCNC: 3.7 MMOL/L (ref 3.4–5)
RBC # BLD AUTO: 3.09 M/MM3 (ref 4.1–5.3)
SODIUM SERPL-SCNC: 134 MMOL/L (ref 137–145)

## 2018-02-05 PROCEDURE — 0JBQ0ZZ EXCISION OF RIGHT FOOT SUBCUTANEOUS TISSUE AND FASCIA, OPEN APPROACH: ICD-10-PCS | Performed by: ORTHOPAEDIC SURGERY

## 2018-02-06 VITALS — HEART RATE: 76 BPM | TEMPERATURE: 98.2 F | DIASTOLIC BLOOD PRESSURE: 69 MMHG | SYSTOLIC BLOOD PRESSURE: 113 MMHG

## 2018-02-06 VITALS — TEMPERATURE: 98.6 F | SYSTOLIC BLOOD PRESSURE: 117 MMHG | DIASTOLIC BLOOD PRESSURE: 51 MMHG | HEART RATE: 76 BPM

## 2018-02-06 VITALS — DIASTOLIC BLOOD PRESSURE: 51 MMHG | SYSTOLIC BLOOD PRESSURE: 117 MMHG | HEART RATE: 76 BPM | TEMPERATURE: 98.6 F

## 2018-02-06 LAB
ANION GAP SERPL CALC-SCNC: 5 MMOL/L (ref 7–16)
BASOPHILS # BLD: 0 10*3/UL (ref 0–0.2)
BASOPHILS NFR BLD AUTO: 0.4 % (ref 0–2)
BUN SERPL-MCNC: 20 MG/DL (ref 7–17)
CALCIUM SERPL-MCNC: 8.8 MG/DL (ref 8.4–10.2)
CHLORIDE SERPL-SCNC: 103 MMOL/L (ref 98–107)
CO2 SERPL-SCNC: 27 MMOL/L (ref 22–30)
CREAT SERPL-SCNC: 1.07 MG/DL (ref 0.52–1.25)
EOSINOPHIL # BLD: 0.1 10*3/UL (ref 0–0.7)
EOSINOPHIL NFR BLD: 1.2 % (ref 0–4)
ERYTHROCYTE [DISTWIDTH] IN BLOOD BY AUTOMATED COUNT: 17 % (ref 11.5–14.5)
GLUCOSE SERPL-MCNC: 106 MG/DL (ref 74–106)
GRANULOCYTES # BLD AUTO: 49.6 % (ref 42.2–75.2)
HCT VFR BLD AUTO: 29.6 % (ref 37–47)
HGB BLD-MCNC: 9.1 G/DL (ref 12.5–16)
LYMPHOCYTES # BLD: 2.7 10*3/UL (ref 1.2–3.4)
LYMPHOCYTES NFR BLD: 40 % (ref 20–51)
MCH RBC QN AUTO: 31 PG (ref 27–31)
MCHC RBC AUTO-ENTMCNC: 31 G/DL (ref 33–37)
MCV RBC AUTO: 102 FL (ref 80–100)
MONOCYTES # BLD: 0.6 10*3/UL (ref 0.1–0.6)
MONOCYTES NFR BLD AUTO: 8.5 % (ref 1.7–9.3)
NEUTROPHILS # BLD: 3.4 10*3/UL (ref 1.4–6.5)
PLATELET # BLD AUTO: 361 K/MM3 (ref 130–400)
PMV BLD AUTO: 10.1 FL (ref 7.4–10.4)
POTASSIUM SERPL-SCNC: 3.9 MMOL/L (ref 3.4–5)
RBC # BLD AUTO: 2.9 M/MM3 (ref 4.1–5.3)
SODIUM SERPL-SCNC: 135 MMOL/L (ref 137–145)

## 2018-04-05 ENCOUNTER — HOSPITAL ENCOUNTER (OUTPATIENT)
Dept: HOSPITAL 19 - COL.RAD | Age: 68
End: 2018-04-05
Attending: FAMILY MEDICINE
Payer: MEDICARE

## 2018-04-05 DIAGNOSIS — Z90.710: ICD-10-CM

## 2018-04-05 DIAGNOSIS — Z90.49: ICD-10-CM

## 2018-04-05 DIAGNOSIS — Z98.890: ICD-10-CM

## 2018-04-05 DIAGNOSIS — R10.9: Primary | ICD-10-CM

## 2020-06-09 ENCOUNTER — HOSPITAL ENCOUNTER (INPATIENT)
Dept: HOSPITAL 19 - SURG | Age: 70
LOS: 5 days | Discharge: NURSING, REHAB OR LONG-TERM CARE | DRG: 493 | End: 2020-06-14
Attending: ORTHOPAEDIC SURGERY | Admitting: ORTHOPAEDIC SURGERY
Payer: MEDICARE

## 2020-06-09 VITALS — WEIGHT: 196.87 LBS | BODY MASS INDEX: 34.88 KG/M2 | HEIGHT: 62.99 IN

## 2020-06-09 VITALS — DIASTOLIC BLOOD PRESSURE: 80 MMHG | SYSTOLIC BLOOD PRESSURE: 174 MMHG

## 2020-06-09 VITALS — HEART RATE: 82 BPM | DIASTOLIC BLOOD PRESSURE: 118 MMHG | TEMPERATURE: 97.9 F | SYSTOLIC BLOOD PRESSURE: 160 MMHG

## 2020-06-09 DIAGNOSIS — Z90.49: ICD-10-CM
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
## 2020-06-09 DIAGNOSIS — S70.01XA: ICD-10-CM

## 2020-06-09 DIAGNOSIS — Z87.11: ICD-10-CM

## 2020-06-09 DIAGNOSIS — E11.9: ICD-10-CM

## 2020-06-09 DIAGNOSIS — G40.909: ICD-10-CM

## 2020-06-09 DIAGNOSIS — D64.9: ICD-10-CM

## 2020-06-09 DIAGNOSIS — Z90.89: ICD-10-CM

## 2020-06-09 DIAGNOSIS — H54.40: ICD-10-CM

## 2020-06-09 DIAGNOSIS — G47.00: ICD-10-CM

## 2020-06-09 DIAGNOSIS — Z79.84: ICD-10-CM

## 2020-06-09 DIAGNOSIS — M81.0: ICD-10-CM

## 2020-06-09 DIAGNOSIS — G89.4: ICD-10-CM

## 2020-06-09 DIAGNOSIS — S32.039A: ICD-10-CM

## 2020-06-09 DIAGNOSIS — I10: ICD-10-CM

## 2020-06-09 DIAGNOSIS — F32.9: ICD-10-CM

## 2020-06-09 DIAGNOSIS — E78.5: ICD-10-CM

## 2020-06-09 DIAGNOSIS — S63.502A: ICD-10-CM

## 2020-06-09 DIAGNOSIS — S70.02XA: ICD-10-CM

## 2020-06-09 DIAGNOSIS — S82.841A: Primary | ICD-10-CM

## 2020-06-09 DIAGNOSIS — K21.9: ICD-10-CM

## 2020-06-09 DIAGNOSIS — Z90.710: ICD-10-CM

## 2020-06-09 DIAGNOSIS — Z20.828: ICD-10-CM

## 2020-06-09 LAB
ALBUMIN SERPL-MCNC: 3.7 GM/DL (ref 3.5–5)
ALP SERPL-CCNC: 61 U/L (ref 50–136)
ALT SERPL-CCNC: 23 U/L (ref 4–34)
ANION GAP SERPL CALC-SCNC: 6 MMOL/L (ref 7–16)
AST SERPL-CCNC: 32 U/L (ref 15–37)
BASOPHILS # BLD: 0.1 10*3/UL (ref 0–0.2)
BASOPHILS NFR BLD AUTO: 0.6 % (ref 0–2)
BILIRUB SERPL-MCNC: 1 MG/DL (ref 0–1)
BUN SERPL-MCNC: 35 MG/DL (ref 7–17)
CALCIUM SERPL-MCNC: 9.1 MG/DL (ref 8.4–10.2)
CHLORIDE SERPL-SCNC: 99 MMOL/L (ref 98–107)
CO2 SERPL-SCNC: 26 MMOL/L (ref 22–30)
CREAT SERPL-SCNC: 1.03 UMOL/L (ref 0.52–1.25)
EOSINOPHIL # BLD: 0.2 10*3/UL (ref 0–0.7)
EOSINOPHIL NFR BLD: 2.1 % (ref 0–4)
ERYTHROCYTE [DISTWIDTH] IN BLOOD BY AUTOMATED COUNT: 12.6 % (ref 11.5–14.5)
GLUCOSE SERPL-MCNC: 114 MG/DL (ref 74–106)
GRANULOCYTES # BLD AUTO: 69.2 % (ref 42.2–75.2)
HCT VFR BLD AUTO: 27.9 % (ref 37–47)
HGB BLD-MCNC: 9.4 G/DL (ref 12.5–16)
LYMPHOCYTES # BLD: 1.7 10*3/UL (ref 1.2–3.4)
LYMPHOCYTES NFR BLD: 20 % (ref 20–51)
MCH RBC QN AUTO: 32 PG (ref 27–31)
MCHC RBC AUTO-ENTMCNC: 34 G/DL (ref 33–37)
MCV RBC AUTO: 95 FL (ref 80–100)
MONOCYTES # BLD: 0.6 10*3/UL (ref 0.1–0.6)
MONOCYTES NFR BLD AUTO: 7.2 % (ref 1.7–9.3)
NEUTROPHILS # BLD: 5.8 10*3/UL (ref 1.4–6.5)
PH UR STRIP.AUTO: 6 [PH] (ref 5–8)
PLATELET # BLD AUTO: 341 K/MM3 (ref 130–400)
PMV BLD AUTO: 10.4 FL (ref 7.4–10.4)
POTASSIUM SERPL-SCNC: 4.4 MMOL/L (ref 3.4–5)
PROT SERPL-MCNC: 6.8 GM/DL (ref 6.4–8.2)
RBC # BLD AUTO: 2.94 M/MM3 (ref 4.1–5.3)
RBC # UR: (no result) /HPF
SODIUM SERPL-SCNC: 132 MMOL/L (ref 137–145)
SP GR UR STRIP.AUTO: 1.01 (ref 1–1.03)
URN COLLECT METHOD CLASS: (no result)

## 2020-06-09 PROCEDURE — C1751 CATH, INF, PER/CENT/MIDLINE: HCPCS

## 2020-06-09 PROCEDURE — C1713 ANCHOR/SCREW BN/BN,TIS/BN: HCPCS

## 2020-06-09 NOTE — NUR
Assessment complete. Lungs clear. Heart sounds normal. Bowels active x4.
Pulses present. Bilateral lower extremity edema +1. INT left foot without
complications. Reports 9/10 pain in back and right ankle. Fay AHUMADAN notifed
of consult. Patient denies needs and further questions at this time.
Orientated to medical floor. Call light in reach.

## 2020-06-09 NOTE — NUR
IV started in left upper arm/shoulder by House supervisior CALIN Collado. INT left
foot removed at this time.

## 2020-06-10 VITALS — TEMPERATURE: 98 F | DIASTOLIC BLOOD PRESSURE: 52 MMHG | HEART RATE: 55 BPM | SYSTOLIC BLOOD PRESSURE: 113 MMHG

## 2020-06-10 VITALS — TEMPERATURE: 98.2 F | SYSTOLIC BLOOD PRESSURE: 130 MMHG | HEART RATE: 63 BPM | DIASTOLIC BLOOD PRESSURE: 52 MMHG

## 2020-06-10 VITALS — HEART RATE: 66 BPM | SYSTOLIC BLOOD PRESSURE: 131 MMHG | TEMPERATURE: 98.5 F | DIASTOLIC BLOOD PRESSURE: 59 MMHG

## 2020-06-10 VITALS — TEMPERATURE: 98.8 F | DIASTOLIC BLOOD PRESSURE: 68 MMHG | HEART RATE: 66 BPM | SYSTOLIC BLOOD PRESSURE: 153 MMHG

## 2020-06-10 VITALS — HEART RATE: 73 BPM | DIASTOLIC BLOOD PRESSURE: 66 MMHG | SYSTOLIC BLOOD PRESSURE: 147 MMHG | TEMPERATURE: 98.5 F

## 2020-06-10 VITALS — SYSTOLIC BLOOD PRESSURE: 113 MMHG | DIASTOLIC BLOOD PRESSURE: 52 MMHG | TEMPERATURE: 98 F | HEART RATE: 58 BPM

## 2020-06-10 VITALS — HEART RATE: 63 BPM | SYSTOLIC BLOOD PRESSURE: 138 MMHG | DIASTOLIC BLOOD PRESSURE: 57 MMHG | TEMPERATURE: 98.9 F

## 2020-06-10 LAB
ANION GAP SERPL CALC-SCNC: 4 MMOL/L (ref 7–16)
BASOPHILS # BLD: 0.1 10*3/UL (ref 0–0.2)
BASOPHILS NFR BLD AUTO: 0.8 % (ref 0–2)
BUN SERPL-MCNC: 29 MG/DL (ref 7–17)
C DIFF TOX A+B STL IA-ACNC: (no result)
C DIFF TOX A+B STL QL IA: (no result)
CALCIUM SERPL-MCNC: 8.9 MG/DL (ref 8.4–10.2)
CHLORIDE SERPL-SCNC: 101 MMOL/L (ref 98–107)
CO2 SERPL-SCNC: 28 MMOL/L (ref 22–30)
CREAT SERPL-SCNC: 0.96 UMOL/L (ref 0.52–1.25)
EOSINOPHIL # BLD: 0.3 10*3/UL (ref 0–0.7)
EOSINOPHIL NFR BLD: 3.9 % (ref 0–4)
ERYTHROCYTE [DISTWIDTH] IN BLOOD BY AUTOMATED COUNT: 12.6 % (ref 11.5–14.5)
GLUCOSE SERPL-MCNC: 108 MG/DL (ref 74–106)
GRANULOCYTES # BLD AUTO: 62.6 % (ref 42.2–75.2)
HCT VFR BLD AUTO: 27 % (ref 37–47)
HGB BLD-MCNC: 9 G/DL (ref 12.5–16)
LYMPHOCYTES # BLD: 1.8 10*3/UL (ref 1.2–3.4)
LYMPHOCYTES NFR BLD: 23.5 % (ref 20–51)
MCH RBC QN AUTO: 32 PG (ref 27–31)
MCHC RBC AUTO-ENTMCNC: 33 G/DL (ref 33–37)
MCV RBC AUTO: 96 FL (ref 80–100)
MONOCYTES # BLD: 0.6 10*3/UL (ref 0.1–0.6)
MONOCYTES NFR BLD AUTO: 8.3 % (ref 1.7–9.3)
NEUTROPHILS # BLD: 4.7 10*3/UL (ref 1.4–6.5)
PLATELET # BLD AUTO: 342 K/MM3 (ref 130–400)
PMV BLD AUTO: 11 FL (ref 7.4–10.4)
POTASSIUM SERPL-SCNC: 4 MMOL/L (ref 3.4–5)
RBC # BLD AUTO: 2.81 M/MM3 (ref 4.1–5.3)
SODIUM SERPL-SCNC: 133 MMOL/L (ref 137–145)

## 2020-06-10 NOTE — NUR
Patient to MRI with Keisha. I spoke with  this am regaurding consult
& plans for vertebroplasty later in the week. Patient transferred well to Mri
cart.  did rounded prior to MRI. Plan of care reviewed. Dr. Omer
rounded this am. Stressed importance of Ice & elevation. Patient reports her
pain a 10/10. Home medications given. Splint intact to wrist, cms intact.
Right ankle splint intact. Cms intact. Patient main complaint is of her back
pain. She is blind, but seems to do well. Will await her return

## 2020-06-10 NOTE — NUR
Patient repositioned in bed & provided with incontinece care. loose stool.
Cdif was negative. SHe was fed dinner. tolerated well. Continue to Ice &
elevate RLE. Back pain continue to be main complaint. Report to night nurse.

## 2020-06-10 NOTE — NUR
Patient reports 8/10 pain in back and right leg pain. Provided with PRN
dilaudid. Denies other needs at this time.

## 2020-06-10 NOTE — NUR
Reporting 7/10 right ankle pain. Provided with PRN dilaudid at this time.
Denies other needs. Call light in reach.

## 2020-06-10 NOTE — NUR
Patient continues to have persistant complaints of back pain. See Emar for
medications given. Patient and her family have questions regaurding
Vertebroplasty.  notified & he is going to call her spouse. Patient
provided with Kpad for pain relief. Patient also repostioned as needed.
Continue to ice & elevated to RLE. IVF Left upper arm per orders. Olman & scds
to LLE

## 2020-06-10 NOTE — NUR
Reports back and right ankle pain 9/10. Provided with PRN dilaudid at this
time. Denies other needs.

## 2020-06-10 NOTE — NUR
Patient required PRN dilaudid for pain control throughout night. Otherwise
uneventful night. Resting in bed this Am. Call light in reach.

## 2020-06-10 NOTE — NUR
SW met with the patient to discuss discharge plan. The patient lives in [place] with her , Angi (ph#[phone]). They are both legally
blind. She reports independence with ADLs and has a cane, walker, and
wheelchair. She receives private duty services for four hours a week from [facility] for shopping, cooking, and medication set up. She
states that she also receives transportation services from [facility] out of [place].
The patient's PCP is Dr. Catie Park and she [facility] her [facility]. She reports no difficulties obtaining her meds.
The patient does not have advanced directives and she was not interested in
completing them at this time. The patient plans to return home with her
 upon discharge. The patient is to have surgery tomorrow. SW to
continue to follow.

## 2020-06-11 VITALS — DIASTOLIC BLOOD PRESSURE: 64 MMHG | SYSTOLIC BLOOD PRESSURE: 153 MMHG | HEART RATE: 63 BPM

## 2020-06-11 VITALS — SYSTOLIC BLOOD PRESSURE: 164 MMHG | TEMPERATURE: 98.2 F | HEART RATE: 64 BPM | DIASTOLIC BLOOD PRESSURE: 71 MMHG

## 2020-06-11 VITALS — HEART RATE: 65 BPM | DIASTOLIC BLOOD PRESSURE: 72 MMHG | TEMPERATURE: 97.9 F | SYSTOLIC BLOOD PRESSURE: 150 MMHG

## 2020-06-11 VITALS — HEART RATE: 59 BPM | SYSTOLIC BLOOD PRESSURE: 123 MMHG | DIASTOLIC BLOOD PRESSURE: 44 MMHG | TEMPERATURE: 98 F

## 2020-06-11 VITALS — HEART RATE: 67 BPM | SYSTOLIC BLOOD PRESSURE: 165 MMHG | DIASTOLIC BLOOD PRESSURE: 64 MMHG

## 2020-06-11 VITALS — HEART RATE: 64 BPM | DIASTOLIC BLOOD PRESSURE: 60 MMHG | SYSTOLIC BLOOD PRESSURE: 145 MMHG | TEMPERATURE: 99.4 F

## 2020-06-11 VITALS — HEART RATE: 67 BPM | DIASTOLIC BLOOD PRESSURE: 68 MMHG | SYSTOLIC BLOOD PRESSURE: 172 MMHG

## 2020-06-11 VITALS — HEART RATE: 62 BPM | DIASTOLIC BLOOD PRESSURE: 62 MMHG | SYSTOLIC BLOOD PRESSURE: 154 MMHG

## 2020-06-11 LAB
ANION GAP SERPL CALC-SCNC: 5 MMOL/L (ref 7–16)
BASOPHILS # BLD: 0 10*3/UL (ref 0–0.2)
BASOPHILS NFR BLD AUTO: 0.5 % (ref 0–2)
BUN SERPL-MCNC: 21 MG/DL (ref 7–17)
CALCIUM SERPL-MCNC: 8.8 MG/DL (ref 8.4–10.2)
CHLORIDE SERPL-SCNC: 101 MMOL/L (ref 98–107)
CO2 SERPL-SCNC: 27 MMOL/L (ref 22–30)
CREAT SERPL-SCNC: 0.86 UMOL/L (ref 0.52–1.25)
EOSINOPHIL # BLD: 0.3 10*3/UL (ref 0–0.7)
EOSINOPHIL NFR BLD: 3.9 % (ref 0–4)
ERYTHROCYTE [DISTWIDTH] IN BLOOD BY AUTOMATED COUNT: 12.6 % (ref 11.5–14.5)
GLUCOSE SERPL-MCNC: 113 MG/DL (ref 74–106)
GRANULOCYTES # BLD AUTO: 60.7 % (ref 42.2–75.2)
HCT VFR BLD AUTO: 26.9 % (ref 37–47)
HGB BLD-MCNC: 9.1 G/DL (ref 12.5–16)
LYMPHOCYTES # BLD: 2.2 10*3/UL (ref 1.2–3.4)
LYMPHOCYTES NFR BLD: 26.5 % (ref 20–51)
MCH RBC QN AUTO: 33 PG (ref 27–31)
MCHC RBC AUTO-ENTMCNC: 34 G/DL (ref 33–37)
MCV RBC AUTO: 96 FL (ref 80–100)
MONOCYTES # BLD: 0.6 10*3/UL (ref 0.1–0.6)
MONOCYTES NFR BLD AUTO: 7.7 % (ref 1.7–9.3)
NEUTROPHILS # BLD: 4.9 10*3/UL (ref 1.4–6.5)
PLATELET # BLD AUTO: 392 K/MM3 (ref 130–400)
PMV BLD AUTO: 10.5 FL (ref 7.4–10.4)
POTASSIUM SERPL-SCNC: 4.2 MMOL/L (ref 3.4–5)
RBC # BLD AUTO: 2.79 M/MM3 (ref 4.1–5.3)
SODIUM SERPL-SCNC: 133 MMOL/L (ref 137–145)

## 2020-06-11 PROCEDURE — 0QSJ04Z REPOSITION RIGHT FIBULA WITH INTERNAL FIXATION DEVICE, OPEN APPROACH: ICD-10-PCS | Performed by: ORTHOPAEDIC SURGERY

## 2020-06-11 PROCEDURE — 02HV33Z INSERTION OF INFUSION DEVICE INTO SUPERIOR VENA CAVA, PERCUTANEOUS APPROACH: ICD-10-PCS

## 2020-06-11 NOTE — NUR
Assessment completed, alert/oriented, vital signs stable, reports severe
ankle and back pain, giving pain meds as ordered, right ankle splint in place/
no signs of impaired circulation/ cap refill <3 seconds, denies any
numbness/tingling, heart RRR, lungs CTA, got PICC plaed to GÉNESIS, INT remvoed
from DAVID, patient NPO, signed consnet for ORIF of right ankle today, densies
other needs at this time

## 2020-06-11 NOTE — NUR
Dr. Harris notified about results from previous Covid screen from [facility]
and this nurse asked if they still wanted the swab completed. Dr. Harris
stated to address this with the day shift team. Will address this with the day
shift nurse at shift change.

## 2020-06-11 NOTE — NUR
Patient has rested well throughout the night. Frequently asks questions about
upcoming procedures and all questions are answered. Patient has been NPO
since midnight. IV to left shoulder infusing with no difficulties. Patient
repositioned q2 hours. Patient c/o pain frequently and PRN pain medication
administered as ordered. Orozco draining clear yellow urine. Ace wrap covering
right ankle. Minimal swelling noted. Splint and ace wrap present to left
ankle. Will continue to monitor patient.

## 2020-06-11 NOTE — NUR
patient arrived back to Surgical floor from PACU at 1725, she is drowsy but
arousable, vital signs stable, pain is controlled, will continue to monitor

## 2020-06-11 NOTE — NUR
Patient had negative MRSA nare swab, per infection control nurse/ we can leave
off contact p/c for now but still need a 2nd negative screeen to clear MRSA
off her profile

## 2020-06-12 VITALS — HEART RATE: 71 BPM | DIASTOLIC BLOOD PRESSURE: 90 MMHG | SYSTOLIC BLOOD PRESSURE: 207 MMHG

## 2020-06-12 VITALS — DIASTOLIC BLOOD PRESSURE: 60 MMHG | TEMPERATURE: 98.2 F | HEART RATE: 65 BPM | SYSTOLIC BLOOD PRESSURE: 169 MMHG

## 2020-06-12 VITALS — HEART RATE: 58 BPM | SYSTOLIC BLOOD PRESSURE: 104 MMHG | DIASTOLIC BLOOD PRESSURE: 47 MMHG

## 2020-06-12 VITALS — TEMPERATURE: 98.3 F | DIASTOLIC BLOOD PRESSURE: 43 MMHG | HEART RATE: 71 BPM | SYSTOLIC BLOOD PRESSURE: 126 MMHG

## 2020-06-12 VITALS — TEMPERATURE: 98.9 F | SYSTOLIC BLOOD PRESSURE: 144 MMHG | DIASTOLIC BLOOD PRESSURE: 67 MMHG | HEART RATE: 62 BPM

## 2020-06-12 VITALS — TEMPERATURE: 97.6 F | SYSTOLIC BLOOD PRESSURE: 97 MMHG | HEART RATE: 57 BPM | DIASTOLIC BLOOD PRESSURE: 61 MMHG

## 2020-06-12 VITALS — HEART RATE: 59 BPM | DIASTOLIC BLOOD PRESSURE: 30 MMHG | TEMPERATURE: 98.6 F | SYSTOLIC BLOOD PRESSURE: 94 MMHG

## 2020-06-12 LAB
ANION GAP SERPL CALC-SCNC: 3 MMOL/L (ref 7–16)
BUN SERPL-MCNC: 18 MG/DL (ref 7–17)
CALCIUM SERPL-MCNC: 6.3 MG/DL (ref 8.4–10.2)
CHLORIDE SERPL-SCNC: 112 MMOL/L (ref 98–107)
CO2 SERPL-SCNC: 21 MMOL/L (ref 22–30)
CREAT SERPL-SCNC: 0.75 UMOL/L (ref 0.52–1.25)
ERYTHROCYTE [DISTWIDTH] IN BLOOD BY AUTOMATED COUNT: 12.5 % (ref 11.5–14.5)
GLUCOSE SERPL-MCNC: 69 MG/DL (ref 74–106)
HCT VFR BLD AUTO: 22.3 % (ref 37–47)
HCT VFR BLD AUTO: 25.8 % (ref 37–47)
HGB BLD-MCNC: 7.1 G/DL (ref 12.5–16)
HGB BLD-MCNC: 8.6 G/DL (ref 12.5–16)
LYMPHOCYTES NFR BLD MANUAL: 23 % (ref 20–51)
MCH RBC QN AUTO: 32 PG (ref 27–31)
MCHC RBC AUTO-ENTMCNC: 32 G/DL (ref 33–37)
MCV RBC AUTO: 99 FL (ref 80–100)
METAMYELOCYTES NFR BLD MANUAL: 2 % (ref 0–0)
MONOCYTES NFR BLD: 3 % (ref 1.7–9.3)
NEUTS BAND NFR BLD: 9 % (ref 0–10)
NEUTS SEG NFR BLD MANUAL: 63 % (ref 42–75.2)
PLATELET # BLD AUTO: 297 K/MM3 (ref 130–400)
PLATELET BLD QL SMEAR: NORMAL
PMV BLD AUTO: 10.6 FL (ref 7.4–10.4)
POTASSIUM SERPL-SCNC: 3.3 MMOL/L (ref 3.4–5)
RBC # BLD AUTO: 2.25 M/MM3 (ref 4.1–5.3)
SODIUM SERPL-SCNC: 136 MMOL/L (ref 137–145)

## 2020-06-12 PROCEDURE — 0QU03JZ SUPPLEMENT LUMBAR VERTEBRA WITH SYNTHETIC SUBSTITUTE, PERCUTANEOUS APPROACH: ICD-10-PCS | Performed by: RADIOLOGY

## 2020-06-12 NOTE — NUR
Patient has rested well throughout the night. PICC line to GÉNESIS infusing IVF
and antibiotics as ordered. Cast with ace wrap noted to right lower leg.
Dressing clean, dry, and intact. Patient able to pick leg up and move it on
the bed. Splint and ace wrap noted to left wrist. Patient complains of pain
8/10 when pain is assesssed. PRN Diluadid given as needed. Patient has been
NPO since midnight for procedure this morning. Orozco catheter present and
draining clear, yellow urine. Repositioning assisted by staff members. Will
continue to monitor patient.

## 2020-06-12 NOTE — NUR
SW met with the patient to review discharge plan and to discuss post-acute
rehab. The patient reports that she would be agreeable to rehab. SW reviewed
Medicare.gov's list of nursing homes in the [place] area. 1) The patient
chose 1) [facility] 2) [facility] 2) [facility] 4) [facility] [place]. TIFFANIE contacted and faxed a referral to
all four facilities. SW awaiting their screens.

## 2020-06-12 NOTE — NUR
Pt. laying in bed at this time. Pt. is A&OX3, assessment complete.  PICC to
rt. upper arm patent.  Dressint to rt. foot CDI. CMS wnl.  Pt. reports pain at
a 7 on pain scale, gave scheduled pain medication.  Pt. voiced that she was
afraid she did not have enough pain medication.  Informed pt. that this nurse
can call the provider and see what their recommondation is.  Pt. voices
understanding.  Hospitalist on call notified.  Discussed situation with her.
No new orders.  Called RASHI Becerril, also no new orders.  Will continue to
monitor.

## 2020-06-12 NOTE — NUR
Eunice, at [facility], reports that they are considering the
patient; but that they would require another COVID test. A COVID test had been
done with the patient was at [facility].
 
Promise, at [facility], reports that they are able to accept the
patient tomorrow, if the patient does decide to go there. She states that a
doc-to-doc call would just need to be done. The physician at [facility]
would be Dr. Saucedo (ph#[phone]). Rose, at [facility], reports
that they are able to accept the patient tomorrow. TIFFANIE met with the patient and
the patient's , Angi (ph#[phone]), to update. The patient and
her  report that they are unsure if they would have transport to [facility] and are not sure if they would want to private pay for an ambulace. The
patient and her  report that they would like some time to think about
their options, before making a decision on facility. TIFFANIE to continue to follow.

## 2020-06-12 NOTE — NUR
Pt transferred back to Sugical unit post-vertebroplasty. Pt connected to VS's
monitors. Pt A&Ox3 and answers questions appropriately. Bedside handoff to
CALIN Avilez. This RN updating regarding morning lab glucose level of 69, D5
1/2NS infused during procedure per Dr Zambrano. Surgical RN to recheck glucose.
This RN also notifying surgical RN of elevated BP during procedure. Pt to
receive scheduled antihypertensives this AM. No s/sx of distress noted at
this time.

## 2020-06-13 VITALS — SYSTOLIC BLOOD PRESSURE: 118 MMHG | TEMPERATURE: 98.9 F | HEART RATE: 65 BPM | DIASTOLIC BLOOD PRESSURE: 40 MMHG

## 2020-06-13 VITALS — SYSTOLIC BLOOD PRESSURE: 149 MMHG | DIASTOLIC BLOOD PRESSURE: 67 MMHG | HEART RATE: 100 BPM | TEMPERATURE: 99.7 F

## 2020-06-13 VITALS — DIASTOLIC BLOOD PRESSURE: 57 MMHG | TEMPERATURE: 98.2 F | HEART RATE: 67 BPM | SYSTOLIC BLOOD PRESSURE: 147 MMHG

## 2020-06-13 VITALS — DIASTOLIC BLOOD PRESSURE: 62 MMHG | SYSTOLIC BLOOD PRESSURE: 127 MMHG | HEART RATE: 65 BPM

## 2020-06-13 VITALS — DIASTOLIC BLOOD PRESSURE: 65 MMHG | HEART RATE: 65 BPM | TEMPERATURE: 98.6 F | SYSTOLIC BLOOD PRESSURE: 153 MMHG

## 2020-06-13 VITALS — SYSTOLIC BLOOD PRESSURE: 95 MMHG | TEMPERATURE: 98 F | HEART RATE: 55 BPM | DIASTOLIC BLOOD PRESSURE: 57 MMHG

## 2020-06-13 VITALS — SYSTOLIC BLOOD PRESSURE: 99 MMHG | TEMPERATURE: 98.8 F | HEART RATE: 66 BPM | DIASTOLIC BLOOD PRESSURE: 51 MMHG

## 2020-06-13 LAB
ANION GAP SERPL CALC-SCNC: 5 MMOL/L (ref 7–16)
BASOPHILS # BLD: 0 10*3/UL (ref 0–0.2)
BASOPHILS NFR BLD AUTO: 0.5 % (ref 0–2)
BUN SERPL-MCNC: 14 MG/DL (ref 7–17)
CALCIUM SERPL-MCNC: 8.6 MG/DL (ref 8.4–10.2)
CHLORIDE SERPL-SCNC: 98 MMOL/L (ref 98–107)
CO2 SERPL-SCNC: 27 MMOL/L (ref 22–30)
CREAT SERPL-SCNC: 0.83 UMOL/L (ref 0.52–1.25)
EOSINOPHIL # BLD: 0.3 10*3/UL (ref 0–0.7)
EOSINOPHIL NFR BLD: 3.2 % (ref 0–4)
ERYTHROCYTE [DISTWIDTH] IN BLOOD BY AUTOMATED COUNT: 12.9 % (ref 11.5–14.5)
GLUCOSE SERPL-MCNC: 158 MG/DL (ref 74–106)
GRANULOCYTES # BLD AUTO: 66 % (ref 42.2–75.2)
HCT VFR BLD AUTO: 25.5 % (ref 37–47)
HGB BLD-MCNC: 8.3 G/DL (ref 12.5–16)
LYMPHOCYTES # BLD: 1.5 10*3/UL (ref 1.2–3.4)
LYMPHOCYTES NFR BLD: 19 % (ref 20–51)
MCH RBC QN AUTO: 32 PG (ref 27–31)
MCHC RBC AUTO-ENTMCNC: 33 G/DL (ref 33–37)
MCV RBC AUTO: 99 FL (ref 80–100)
MONOCYTES # BLD: 0.8 10*3/UL (ref 0.1–0.6)
MONOCYTES NFR BLD AUTO: 10.3 % (ref 1.7–9.3)
NEUTROPHILS # BLD: 5.3 10*3/UL (ref 1.4–6.5)
PLATELET # BLD AUTO: 375 K/MM3 (ref 130–400)
PMV BLD AUTO: 10.5 FL (ref 7.4–10.4)
POTASSIUM SERPL-SCNC: 3.9 MMOL/L (ref 3.4–5)
RBC # BLD AUTO: 2.59 M/MM3 (ref 4.1–5.3)
SODIUM SERPL-SCNC: 130 MMOL/L (ref 137–145)

## 2020-06-13 NOTE — NUR
TIFFANIE received a call from patients provider who indicated that the patient was
medically cleared to go home. Provider asked if SW could speak to her about
her inability to decide where she wishes to go. SW indicated that there are
places that has accepted her, and that there is no medical reason for her to
stay. patient continues to be indescisive.SW discussed options and indicated
 and informed patient that SW will be by tomorrow for a solid decision.

## 2020-06-13 NOTE — NUR
Contacted Dr. Oviedo and explain that the patient BP is 118/40 and that she
is to receive multiple BP medications. Dr. Oviedo explains that it is okay
for patient to get all her BP meds.

## 2020-06-13 NOTE — NUR
Lying in bed. Patient says that her pain does not seem to let up and would
like pain medication when she can. Explained that I would review her meds and
see what can be given. Splint to left wrist in place. Dressing to right lower
foot CDIAlisa Ruiz CNA, will assist patient in eating breakfast.

## 2020-06-13 NOTE — NUR
Pt. sitting up in bed at this time. Pt. is A&OX3, assessment complete. PICC to
rt. upper arm patent.  Pt. continues to question doctors choices on pain
management.  Reenforced 's wishes.  Pt. voices understanding. Pt. continues
to rate pain at a 10, yet pt. is not crying or writhing in pain and is able to
carry on a conversation.  Pt. denies further needs, call light within reach.

## 2020-06-13 NOTE — NUR
Requests pain medication for pain in right ankle. Administered Morphine PO as
prescribed. Patient denies further needs.

## 2020-06-13 NOTE — NUR
Rating pain 9/10 and requests pain medication. Administered Morphine as
prescribed. Patient does not want to eat dinner at this time. Is on phone
talking with family. Denies additional needs.

## 2020-06-13 NOTE — NUR
Lying in bed with eyes open. Rates pain 8/10 in right ankle. Offered IV pain
medication and the patient declines at this time. Patient denies further needs
at this time.

## 2020-06-14 VITALS — SYSTOLIC BLOOD PRESSURE: 155 MMHG | TEMPERATURE: 98.8 F | DIASTOLIC BLOOD PRESSURE: 67 MMHG | HEART RATE: 66 BPM

## 2020-06-14 VITALS — DIASTOLIC BLOOD PRESSURE: 60 MMHG | HEART RATE: 64 BPM | SYSTOLIC BLOOD PRESSURE: 146 MMHG

## 2020-06-14 VITALS — HEART RATE: 60 BPM | TEMPERATURE: 98.5 F | DIASTOLIC BLOOD PRESSURE: 58 MMHG | SYSTOLIC BLOOD PRESSURE: 118 MMHG

## 2020-06-14 VITALS — TEMPERATURE: 98 F | HEART RATE: 60 BPM | SYSTOLIC BLOOD PRESSURE: 142 MMHG | DIASTOLIC BLOOD PRESSURE: 57 MMHG

## 2020-06-14 VITALS — TEMPERATURE: 98.5 F | DIASTOLIC BLOOD PRESSURE: 58 MMHG | HEART RATE: 60 BPM | SYSTOLIC BLOOD PRESSURE: 118 MMHG

## 2020-06-14 LAB
ANION GAP SERPL CALC-SCNC: 5 MMOL/L (ref 7–16)
BUN SERPL-MCNC: 11 MG/DL (ref 7–17)
CALCIUM SERPL-MCNC: 8.8 MG/DL (ref 8.4–10.2)
CHLORIDE SERPL-SCNC: 98 MMOL/L (ref 98–107)
CO2 SERPL-SCNC: 29 MMOL/L (ref 22–30)
CREAT SERPL-SCNC: 0.77 UMOL/L (ref 0.52–1.25)
ERYTHROCYTE [DISTWIDTH] IN BLOOD BY AUTOMATED COUNT: 13 % (ref 11.5–14.5)
GLUCOSE SERPL-MCNC: 125 MG/DL (ref 74–106)
HCT VFR BLD AUTO: 26.9 % (ref 37–47)
HGB BLD-MCNC: 8.8 G/DL (ref 12.5–16)
LYMPHOCYTES NFR BLD MANUAL: 21 % (ref 20–51)
MCH RBC QN AUTO: 32 PG (ref 27–31)
MCHC RBC AUTO-ENTMCNC: 33 G/DL (ref 33–37)
MCV RBC AUTO: 98 FL (ref 80–100)
MONOCYTES NFR BLD: 6 % (ref 1.7–9.3)
NEUTS BAND NFR BLD: 4 % (ref 0–10)
NEUTS SEG NFR BLD MANUAL: 69 % (ref 42–75.2)
PLATELET # BLD AUTO: 355 K/MM3 (ref 130–400)
PLATELET BLD QL SMEAR: NORMAL
PMV BLD AUTO: 10.3 FL (ref 7.4–10.4)
POTASSIUM SERPL-SCNC: 3.9 MMOL/L (ref 3.4–5)
RBC # BLD AUTO: 2.76 M/MM3 (ref 4.1–5.3)
SODIUM SERPL-SCNC: 132 MMOL/L (ref 137–145)

## 2020-06-14 NOTE — NUR
Rating pain 8/10 in right ankle. Morphine administered as prescribed. Patient
eating lunch at this time. Denies further needs.

## 2020-06-14 NOTE — NUR
[facility] here to  patient. Patient transferred into wheelchair with
assist of one and use of walker. Patient has all personal belongings.
Discharge packet provided to [facility] staff.

## 2020-06-14 NOTE — NUR
Lying in bed with eyes open. Rates pain in right ankle 9/10, describes as
sharp. Will administer pain med as scheduled. Dressing to right ankle CDI.
Patient denies further needs.

## 2020-12-18 ENCOUNTER — HOSPITAL ENCOUNTER (OUTPATIENT)
Dept: HOSPITAL 19 - COL.LAB | Age: 70
End: 2020-12-18
Attending: NURSE PRACTITIONER
Payer: MEDICARE

## 2020-12-18 DIAGNOSIS — L97.509: ICD-10-CM

## 2020-12-18 DIAGNOSIS — E13.621: Primary | ICD-10-CM

## 2020-12-23 ENCOUNTER — HOSPITAL ENCOUNTER (INPATIENT)
Dept: HOSPITAL 19 - SURG | Age: 70
LOS: 47 days | Discharge: NURSING, REHAB OR LONG-TERM CARE | DRG: 618 | End: 2021-02-08
Attending: ORTHOPAEDIC SURGERY | Admitting: ORTHOPAEDIC SURGERY
Payer: MEDICARE

## 2020-12-23 VITALS — HEIGHT: 62.99 IN | WEIGHT: 196.21 LBS | BODY MASS INDEX: 34.77 KG/M2

## 2020-12-23 DIAGNOSIS — D50.0: ICD-10-CM

## 2020-12-23 DIAGNOSIS — G40.909: ICD-10-CM

## 2020-12-23 DIAGNOSIS — G89.29: ICD-10-CM

## 2020-12-23 DIAGNOSIS — E78.5: ICD-10-CM

## 2020-12-23 DIAGNOSIS — M54.9: ICD-10-CM

## 2020-12-23 DIAGNOSIS — Z20.828: ICD-10-CM

## 2020-12-23 DIAGNOSIS — E11.9: ICD-10-CM

## 2020-12-23 DIAGNOSIS — I10: ICD-10-CM

## 2020-12-23 DIAGNOSIS — E11.621: Primary | ICD-10-CM

## 2020-12-23 DIAGNOSIS — K21.9: ICD-10-CM

## 2020-12-23 PROCEDURE — A9284 NON-ELECTRONIC SPIROMETER: HCPCS

## 2020-12-23 PROCEDURE — L1830 KO IMMOB CANVAS LONG PRE OTS: HCPCS

## 2021-02-04 VITALS — DIASTOLIC BLOOD PRESSURE: 86 MMHG | HEART RATE: 55 BPM | SYSTOLIC BLOOD PRESSURE: 175 MMHG

## 2021-02-04 VITALS — HEART RATE: 56 BPM | DIASTOLIC BLOOD PRESSURE: 84 MMHG | SYSTOLIC BLOOD PRESSURE: 178 MMHG

## 2021-02-04 VITALS — TEMPERATURE: 97.5 F | HEART RATE: 61 BPM | SYSTOLIC BLOOD PRESSURE: 176 MMHG | DIASTOLIC BLOOD PRESSURE: 63 MMHG

## 2021-02-04 VITALS — DIASTOLIC BLOOD PRESSURE: 86 MMHG | HEART RATE: 57 BPM | SYSTOLIC BLOOD PRESSURE: 175 MMHG

## 2021-02-04 VITALS — SYSTOLIC BLOOD PRESSURE: 178 MMHG | HEART RATE: 56 BPM | DIASTOLIC BLOOD PRESSURE: 84 MMHG

## 2021-02-04 VITALS — HEART RATE: 60 BPM | DIASTOLIC BLOOD PRESSURE: 63 MMHG | SYSTOLIC BLOOD PRESSURE: 176 MMHG

## 2021-02-04 VITALS — HEART RATE: 65 BPM | DIASTOLIC BLOOD PRESSURE: 50 MMHG | TEMPERATURE: 98.4 F | SYSTOLIC BLOOD PRESSURE: 147 MMHG

## 2021-02-04 VITALS — SYSTOLIC BLOOD PRESSURE: 174 MMHG | DIASTOLIC BLOOD PRESSURE: 66 MMHG | HEART RATE: 59 BPM

## 2021-02-04 VITALS — SYSTOLIC BLOOD PRESSURE: 184 MMHG | HEART RATE: 55 BPM | TEMPERATURE: 98.6 F | DIASTOLIC BLOOD PRESSURE: 58 MMHG

## 2021-02-04 PROCEDURE — 0Y6J0Z1 DETACHMENT AT LEFT LOWER LEG, HIGH, OPEN APPROACH: ICD-10-PCS | Performed by: ORTHOPAEDIC SURGERY

## 2021-02-04 NOTE — NUR
PATIENT ADMITED INTO ROOM 343 POST OP. ORIENTED BUT DROWSY. NOTED ELEVATED B/P
IN 'S WITH ALL OTHER VSS. PACU REPORTS ANESTHESIA IS AWARE AND THEY DID
NOT TREAT. PATIENT REPORTS SHE DID TAKE HER B/P PILLS THIS AM PER ANESTHESIA.
LEFT BKA DRESSING IS CD&I WITH ACEWRAP, TECHNOL AND ICE PACK INPLACE. HX OF
[facility], ON CONTACT. HEAD TO TOE ASSESSMENT COMPLETE. PATIENT IS BLIND AND SO IS
HER  WHO IS AT BEDSIDE. ORIENTED TO ROOM AND CALL LIGHT IN REACH.

## 2021-02-04 NOTE — NUR
faxed referrals to [facility], [facility], [facility],
[facility], and to [facility].

## 2021-02-05 VITALS — DIASTOLIC BLOOD PRESSURE: 62 MMHG | HEART RATE: 58 BPM | SYSTOLIC BLOOD PRESSURE: 156 MMHG | TEMPERATURE: 98.8 F

## 2021-02-05 VITALS — HEART RATE: 57 BPM | TEMPERATURE: 98.7 F | SYSTOLIC BLOOD PRESSURE: 167 MMHG | DIASTOLIC BLOOD PRESSURE: 84 MMHG

## 2021-02-05 VITALS — HEART RATE: 58 BPM | SYSTOLIC BLOOD PRESSURE: 157 MMHG | TEMPERATURE: 98.6 F | DIASTOLIC BLOOD PRESSURE: 61 MMHG

## 2021-02-05 VITALS — SYSTOLIC BLOOD PRESSURE: 152 MMHG | DIASTOLIC BLOOD PRESSURE: 58 MMHG | HEART RATE: 58 BPM

## 2021-02-05 VITALS — TEMPERATURE: 97.6 F | HEART RATE: 68 BPM | SYSTOLIC BLOOD PRESSURE: 138 MMHG | DIASTOLIC BLOOD PRESSURE: 49 MMHG

## 2021-02-05 VITALS — DIASTOLIC BLOOD PRESSURE: 59 MMHG | SYSTOLIC BLOOD PRESSURE: 177 MMHG

## 2021-02-05 VITALS — HEART RATE: 62 BPM | SYSTOLIC BLOOD PRESSURE: 151 MMHG | DIASTOLIC BLOOD PRESSURE: 72 MMHG | TEMPERATURE: 98.9 F

## 2021-02-05 VITALS — SYSTOLIC BLOOD PRESSURE: 185 MMHG | DIASTOLIC BLOOD PRESSURE: 77 MMHG | TEMPERATURE: 98.8 F | HEART RATE: 64 BPM

## 2021-02-05 LAB
HCT VFR BLD AUTO: 30.4 % (ref 37–47)
HGB BLD-MCNC: 9.5 G/DL (ref 12.5–16)

## 2021-02-05 NOTE — NUR
SURENDRA Conteh, in room to assist patient with bed bath. BP rechecked and has
decreased. Patient pain still there but a little better.

## 2021-02-05 NOTE — NUR
Patient lying in bed awake. Patient says that her spouse would like to come up
to visit her and he is blind. Patient says that the person who drives them
where they need to go works until 2000 and asks if it would be okay if they
pick him up at 2030. Spoke with Keena and she says that it would be fine if
the spouse came up and if they were picked up at 2030. Patient informed.

## 2021-02-05 NOTE — NUR
TIFFANIE met with the patient and her , Shazia (ph#[phone]), to discuss
discharge plan. The patient lives in [place] with her . The patient
and her  are both legally blind. She reports independence with ADLs and
has a walker and wheelchair. She receives home health services from [facility]. TIFFANIE contacted and faxed updates to Briseyda at [facility]. The patient's
PCP is Dr. Catie Park and she receives her medications from [facility]
[place]. She reports no difficulties obtaining her meds. The patient states that
she and her  receive transportation services through [facility] out of [place]
and from a private duty woman. The patient does not have a DPOA-HC and she was
not interested in completing a DPOA-HC at this time. The patient had a BKA
yesterday. Referrals had been sent to [facility], [facility], [facility], [facility], and [facility]. [facility] declined. The earliest IPR
can take is Sunday, if they were to accept the patient. TIFFANIE updated the patient
and Verygareth on this. The patient states that she would really prefer [facility]. She does not want to go to [facility]. The patient was open to sending
a referral to [facility] as a last resort. TIFFANIE contacted and
faxed a referral to [facility]. Awaiting screen. A COVID test
was ordered.

## 2021-02-05 NOTE — NUR
Patient assisted onto bedpan. Voids, pericare provided. Assisted to
comfortable position in bed. Patient sposue here, patient surprised but happy
that he is here. Denies additional needs.

## 2021-02-05 NOTE — NUR
Patient assisted up to BSC with OT and this nurse. Patient uses walker. Needed
verbal cues to stay in walker and bring walker with her to the commode.
Patient able to void and perform own pericare. Patient assisted back into bed
into comfortable position. Patient denies additional needs at this time.
Spouse in room with the patient.

## 2021-02-05 NOTE — NUR
Patient lying back in recliner with eyes closed. Respirations even and
unlabored. No signs or symptoms of discomfort noted at this time.

## 2021-02-05 NOTE — NUR
Lying in bed visiting with spouse. Patient does not want food at this time and
requests tray not be brought in and kept in fridge for later. Patient denies
additional needs at this time.

## 2021-02-05 NOTE — NUR
Patient tolerating PO fluids without difficulty. Contacted RASHI Medeiros with
Ortho, and orders received to dc IV fluids.

## 2021-02-05 NOTE — NUR
Lying in bed awake. Alert and oriented x4. Rates pain 8/10 to back, left BKA,
and right foot. Explain that she was provided pain medication prior to this
nurse coming on shift and I would bring her scheduled MS Contin. Dressing to
left BKA CDI with immobilizer intact. IV fluids dc'd at this time. Patient
assisted onto bedpan at this time. Ask patient if she has ordered breakfast,
patient says no. Assist patient in getting on phone with dietary to order all
meals for the day at this time.

## 2021-02-05 NOTE — NUR
Patient rates pain 8/10, administer pain medication as prescribed. Patient
assisted up to BSC, voids, pericare provided, assisted into bed. Patient
required assist of two with transfers, gait was unsteady. Assisted into
comfortable position. BP elevated, explain that we will let the pain
medication work and recheck her BP. Patient denies additional needs at this
time.

## 2021-02-05 NOTE — NUR
Pain 8/10 in back and left BKA. Patient would like pain medication, adminsiter
as prescribed. Patient resting in bed with spouse at bedside. Denies
additional needs.

## 2021-02-05 NOTE — NUR
Lying in bed resting. Responds when name called out. Assist patient in setting
up lunch tray. Patient says that her spouse is not coming up today, he will
come tomorrow. Denies additional needs at this time.

## 2021-02-06 VITALS — TEMPERATURE: 98.6 F | HEART RATE: 60 BPM | DIASTOLIC BLOOD PRESSURE: 58 MMHG | SYSTOLIC BLOOD PRESSURE: 169 MMHG

## 2021-02-06 VITALS — TEMPERATURE: 99.3 F | SYSTOLIC BLOOD PRESSURE: 163 MMHG | DIASTOLIC BLOOD PRESSURE: 110 MMHG | HEART RATE: 60 BPM

## 2021-02-06 VITALS — TEMPERATURE: 99.2 F | SYSTOLIC BLOOD PRESSURE: 178 MMHG | DIASTOLIC BLOOD PRESSURE: 59 MMHG | HEART RATE: 59 BPM

## 2021-02-06 VITALS — HEART RATE: 62 BPM | TEMPERATURE: 98.7 F | DIASTOLIC BLOOD PRESSURE: 67 MMHG | SYSTOLIC BLOOD PRESSURE: 198 MMHG

## 2021-02-06 VITALS — HEART RATE: 56 BPM | SYSTOLIC BLOOD PRESSURE: 154 MMHG | TEMPERATURE: 98.8 F | DIASTOLIC BLOOD PRESSURE: 49 MMHG

## 2021-02-06 VITALS — SYSTOLIC BLOOD PRESSURE: 188 MMHG | DIASTOLIC BLOOD PRESSURE: 63 MMHG

## 2021-02-06 VITALS — SYSTOLIC BLOOD PRESSURE: 160 MMHG | HEART RATE: 54 BPM | DIASTOLIC BLOOD PRESSURE: 62 MMHG | TEMPERATURE: 99 F

## 2021-02-06 VITALS — DIASTOLIC BLOOD PRESSURE: 77 MMHG | SYSTOLIC BLOOD PRESSURE: 179 MMHG

## 2021-02-06 NOTE — NUR
AM SHIFT ASSESSMENT COMPLETED. MORNING MEDICATIONS ADMINISTERED. PATIENT
SITTING UP IN BEDSIDE CHAIR THIS MORNING. PATIENT WAS ASSISTED TO THE COMMODE
AND THEN TO THE CHAIR WITH TWO MEMBERS OF NURSING STAFF, PATIENT TOLERATED
WELL. COVID SWAB COLLECTED. WILL CONTINUE TO MONITOR.

## 2021-02-06 NOTE — NUR
GIVEN HS MEDS INCLUDING NORCO 7.5MG FOR PAIN 8/10 TO LEFT STUMP. IS ALERT AND
ORIENTED X3. SL TO LEFT FOREARM FLUSHES WELL. HAS DRSG/IMMOBILIZER TO LEFT
STUMP, D/I.

## 2021-02-06 NOTE — NUR
PATIENT HAD AN UNEVENTFUL SHIFT. PRIMARY COMPLAINT THROUGHOUT THE DAY WAS PAIN
IN THE LEFT STUMP. PRN MEDICATION AND SCHEDULED MEDICATIONS GIVEN AS DIRECTED.
PATIENT CURRENTLY RESTING IN BED WITH LLE ELEVATED ON PILLOW, WITH BRACE IN
PLACE, AND ICE PACK TO END OF STUMP. CALL LIGHT WITHIN REACH. WILL REPORT OFF
TO ONCOMING NURSE.

## 2021-02-06 NOTE — NUR
PT ASKING FOR PAIN MEDS. B/P RECHECKED WITH LARGER CUFF, 177/67 ON LEFT ARM.
MEDICATED WITH NORCO 7.5MG 2 TABS PO AT THIS TIME.

## 2021-02-07 VITALS — SYSTOLIC BLOOD PRESSURE: 111 MMHG | DIASTOLIC BLOOD PRESSURE: 52 MMHG | HEART RATE: 59 BPM | TEMPERATURE: 98.5 F

## 2021-02-07 VITALS — DIASTOLIC BLOOD PRESSURE: 50 MMHG | HEART RATE: 55 BPM | SYSTOLIC BLOOD PRESSURE: 120 MMHG | TEMPERATURE: 98.6 F

## 2021-02-07 VITALS — TEMPERATURE: 98.8 F | SYSTOLIC BLOOD PRESSURE: 117 MMHG | DIASTOLIC BLOOD PRESSURE: 70 MMHG | HEART RATE: 57 BPM

## 2021-02-07 VITALS — TEMPERATURE: 98.3 F | SYSTOLIC BLOOD PRESSURE: 157 MMHG | DIASTOLIC BLOOD PRESSURE: 70 MMHG | HEART RATE: 56 BPM

## 2021-02-07 VITALS — DIASTOLIC BLOOD PRESSURE: 57 MMHG | HEART RATE: 62 BPM | TEMPERATURE: 98.6 F | SYSTOLIC BLOOD PRESSURE: 139 MMHG

## 2021-02-07 VITALS — TEMPERATURE: 98.7 F | DIASTOLIC BLOOD PRESSURE: 53 MMHG | HEART RATE: 55 BPM | SYSTOLIC BLOOD PRESSURE: 133 MMHG

## 2021-02-07 VITALS — SYSTOLIC BLOOD PRESSURE: 175 MMHG | TEMPERATURE: 98.3 F | HEART RATE: 56 BPM | DIASTOLIC BLOOD PRESSURE: 68 MMHG

## 2021-02-07 NOTE — NUR
PATIENT TRANSFERED TO COMMODE FROM CHAIR THEN TO BED. SHE IS SLIGHTLY OFF
BALANCE WHEN UP WITH THE WALKER. SHE IS ABLE TO WIGGLE AND HOP OVER TO
TRANSFER. SHE DOES WELL.

## 2021-02-07 NOTE — NUR
PATIENT ASSESSMENT COMPLETED. PATIENT IS UP IN THE CHAIR EATING BREAKFAST
SHE IS HAVING ALOT OF PAIN RATING IT A 9/10 TO THE LEFT LEG. LEFT LOWER LEG IS
WRAPPED AND IN BRACE.

## 2021-02-07 NOTE — NUR
[facility] agency called and stated they will review updates on Monday and
decide on acceptance. SW will continue to follow.

## 2021-02-08 VITALS — HEART RATE: 52 BPM | SYSTOLIC BLOOD PRESSURE: 104 MMHG | DIASTOLIC BLOOD PRESSURE: 49 MMHG | TEMPERATURE: 97.4 F

## 2021-02-08 VITALS — SYSTOLIC BLOOD PRESSURE: 137 MMHG | DIASTOLIC BLOOD PRESSURE: 49 MMHG | TEMPERATURE: 97.8 F | HEART RATE: 53 BPM

## 2021-02-08 VITALS — TEMPERATURE: 98.5 F | DIASTOLIC BLOOD PRESSURE: 66 MMHG | SYSTOLIC BLOOD PRESSURE: 85 MMHG | HEART RATE: 56 BPM

## 2021-02-08 NOTE — NUR
PT SLEEPING IN BED AT BEDSIDE SHIFT REPORT. PT ASSISTED UP TO COMMODE WITH
WALKER, GAIT BELT, BOOT, AND TWO ASSIST AND THEN TO RECLINER THIS AM. PT
REPORTED 9/10 PAIN AND RECIEVED PRN NORCO AND SCHEDULED MS CONTIN.

## 2021-02-08 NOTE — NUR
The patient is to discharge today, 2/8 to [facility] for post acute rehab.
The team and the patient were agreeable.  [facility] and [facility]
accepted but the patient declined those facilities. The patient will be
transported at 1600. TIFFANIE faxed discharge orders. SW contacted the patient's
, he was agreeable. There are no additional needs.

## 2021-02-08 NOTE — NUR
[facility] NEEDED SCRIP[facility] FOR NARCOTICS. ANGELITA'S PA DID NOT DO
THIS UPON DISCHARGE PAPERWORK. [facility] NOTIFIED THIS NURSE THEY
WERE NEEDED AROUND 1800. SCRIPTS WERE FINALLY OBTAINED AROUND 1915 AND FAXED
TO [facility].

## 2021-02-08 NOTE — NUR
PT RECEIVED ONE MORE DOSE OF NORCO AFTER THIS AM AND A DOSE OF BALDO PRIOR TO
DISCHARGING TO [facility]. PT ASSISTED TO COMMODE, IV
DISCONTINUED TO LUE AND BAG PACKED BY CNA. REPORT CALLED TO NURSE AT SKILLED
FACILITY. PT AWAITING RIDE TO [facility].

## 2021-03-26 ENCOUNTER — HOSPITAL ENCOUNTER (OUTPATIENT)
Dept: HOSPITAL 19 - ZCOL.LAB | Age: 71
End: 2021-03-26
Attending: NURSE PRACTITIONER
Payer: MEDICARE

## 2021-03-26 DIAGNOSIS — T81.89XA: Primary | ICD-10-CM

## 2021-04-27 ENCOUNTER — HOSPITAL ENCOUNTER (OUTPATIENT)
Dept: HOSPITAL 19 - ZLAB.STJ | Age: 71
End: 2021-04-27
Attending: INTERNAL MEDICINE

## 2021-04-27 DIAGNOSIS — N39.0: Primary | ICD-10-CM

## 2021-04-27 LAB
PH UR STRIP.AUTO: 5 [PH] (ref 5–8)
RBC # UR: (no result) /HPF
SP GR UR STRIP.AUTO: 1.01 (ref 1–1.03)
SQUAMOUS # URNS: (no result) /HPF
URN COLLECT METHOD CLASS: (no result)
WBC # UR: (no result) /HPF

## 2021-05-17 ENCOUNTER — HOSPITAL ENCOUNTER (OUTPATIENT)
Dept: HOSPITAL 19 - ZLAB.STJ | Age: 71
End: 2021-05-17
Attending: INTERNAL MEDICINE
Payer: MEDICARE

## 2021-05-17 DIAGNOSIS — E11.59: Primary | ICD-10-CM
